# Patient Record
Sex: FEMALE | Race: WHITE | NOT HISPANIC OR LATINO | Employment: OTHER | URBAN - METROPOLITAN AREA
[De-identification: names, ages, dates, MRNs, and addresses within clinical notes are randomized per-mention and may not be internally consistent; named-entity substitution may affect disease eponyms.]

---

## 2017-04-07 ENCOUNTER — TRANSCRIBE ORDERS (OUTPATIENT)
Dept: ADMINISTRATIVE | Facility: HOSPITAL | Age: 82
End: 2017-04-07

## 2017-04-07 DIAGNOSIS — I10 ESSENTIAL HYPERTENSION: Primary | ICD-10-CM

## 2017-04-14 ENCOUNTER — HOSPITAL ENCOUNTER (OUTPATIENT)
Dept: NON INVASIVE DIAGNOSTICS | Facility: CLINIC | Age: 82
Discharge: HOME | End: 2017-04-14
Payer: MEDICARE

## 2017-04-14 DIAGNOSIS — I10 ESSENTIAL HYPERTENSION: ICD-10-CM

## 2017-04-14 PROCEDURE — 93017 CV STRESS TEST TRACING ONLY: CPT

## 2017-04-14 PROCEDURE — 78452 HT MUSCLE IMAGE SPECT MULT: CPT

## 2017-04-14 PROCEDURE — A9502 TC99M TETROFOSMIN: HCPCS

## 2017-04-14 RX ADMIN — REGADENOSON 0.4 MG: 0.08 INJECTION, SOLUTION INTRAVENOUS at 14:15

## 2017-04-25 LAB
CHEST PAIN STATEMENT: NORMAL
MAX DIASTOLIC BP: 79 MMHG
MAX HEART RATE: 79 BPM
MAX PREDICTED HEART RATE: 140 BPM
MAX. SYSTOLIC BP: 157 MMHG
PROTOCOL NAME: NORMAL
REASON FOR TERMINATION: NORMAL
TARGET HR FORMULA: NORMAL
TEST INDICATION: NORMAL
TIME IN EXERCISE PHASE: 182 S

## 2018-10-15 ENCOUNTER — TRANSCRIBE ORDERS (OUTPATIENT)
Dept: ADMINISTRATIVE | Facility: HOSPITAL | Age: 83
End: 2018-10-15

## 2018-10-15 ENCOUNTER — APPOINTMENT (OUTPATIENT)
Dept: LAB | Facility: CLINIC | Age: 83
End: 2018-10-15
Payer: MEDICARE

## 2018-10-15 DIAGNOSIS — I10 HYPERTENSION, ESSENTIAL: ICD-10-CM

## 2018-10-15 DIAGNOSIS — I10 HYPERTENSION, ESSENTIAL: Primary | ICD-10-CM

## 2018-10-15 DIAGNOSIS — R73.01 IMPAIRED FASTING GLUCOSE: ICD-10-CM

## 2018-10-15 LAB
ALBUMIN SERPL BCP-MCNC: 3.4 G/DL (ref 3.5–5)
ALP SERPL-CCNC: 101 U/L (ref 46–116)
ALT SERPL W P-5'-P-CCNC: 41 U/L (ref 12–78)
ANION GAP SERPL CALCULATED.3IONS-SCNC: 5 MMOL/L (ref 4–13)
AST SERPL W P-5'-P-CCNC: 31 U/L (ref 5–45)
BASOPHILS # BLD AUTO: 0.04 THOUSANDS/ΜL (ref 0–0.1)
BASOPHILS NFR BLD AUTO: 1 % (ref 0–1)
BILIRUB SERPL-MCNC: 0.33 MG/DL (ref 0.2–1)
BUN SERPL-MCNC: 11 MG/DL (ref 5–25)
CALCIUM SERPL-MCNC: 8.8 MG/DL (ref 8.3–10.1)
CHLORIDE SERPL-SCNC: 105 MMOL/L (ref 100–108)
CHOLEST SERPL-MCNC: 182 MG/DL (ref 50–200)
CO2 SERPL-SCNC: 30 MMOL/L (ref 21–32)
CREAT SERPL-MCNC: 0.62 MG/DL (ref 0.6–1.3)
EOSINOPHIL # BLD AUTO: 0.17 THOUSAND/ΜL (ref 0–0.61)
EOSINOPHIL NFR BLD AUTO: 2 % (ref 0–6)
ERYTHROCYTE [DISTWIDTH] IN BLOOD BY AUTOMATED COUNT: 14.4 % (ref 11.6–15.1)
EST. AVERAGE GLUCOSE BLD GHB EST-MCNC: 137 MG/DL
GFR SERPL CREATININE-BSD FRML MDRD: 84 ML/MIN/1.73SQ M
GLUCOSE P FAST SERPL-MCNC: 119 MG/DL (ref 65–99)
HBA1C MFR BLD: 6.4 % (ref 4.2–6.3)
HCT VFR BLD AUTO: 39.1 % (ref 34.8–46.1)
HDLC SERPL-MCNC: 58 MG/DL (ref 40–60)
HGB BLD-MCNC: 12 G/DL (ref 11.5–15.4)
IMM GRANULOCYTES # BLD AUTO: 0.02 THOUSAND/UL (ref 0–0.2)
IMM GRANULOCYTES NFR BLD AUTO: 0 % (ref 0–2)
LDLC SERPL CALC-MCNC: 100 MG/DL (ref 0–100)
LYMPHOCYTES # BLD AUTO: 1.45 THOUSANDS/ΜL (ref 0.6–4.47)
LYMPHOCYTES NFR BLD AUTO: 21 % (ref 14–44)
MCH RBC QN AUTO: 26.1 PG (ref 26.8–34.3)
MCHC RBC AUTO-ENTMCNC: 30.7 G/DL (ref 31.4–37.4)
MCV RBC AUTO: 85 FL (ref 82–98)
MONOCYTES # BLD AUTO: 0.65 THOUSAND/ΜL (ref 0.17–1.22)
MONOCYTES NFR BLD AUTO: 9 % (ref 4–12)
NEUTROPHILS # BLD AUTO: 4.63 THOUSANDS/ΜL (ref 1.85–7.62)
NEUTS SEG NFR BLD AUTO: 67 % (ref 43–75)
NRBC BLD AUTO-RTO: 0 /100 WBCS
PLATELET # BLD AUTO: 284 THOUSANDS/UL (ref 149–390)
PMV BLD AUTO: 10 FL (ref 8.9–12.7)
POTASSIUM SERPL-SCNC: 3.9 MMOL/L (ref 3.5–5.3)
PROT SERPL-MCNC: 6.9 G/DL (ref 6.4–8.2)
RBC # BLD AUTO: 4.59 MILLION/UL (ref 3.81–5.12)
SODIUM SERPL-SCNC: 140 MMOL/L (ref 136–145)
TRIGL SERPL-MCNC: 120 MG/DL
TSH SERPL DL<=0.05 MIU/L-ACNC: 1.6 UIU/ML (ref 0.36–3.74)
WBC # BLD AUTO: 6.96 THOUSAND/UL (ref 4.31–10.16)

## 2018-10-15 PROCEDURE — 83036 HEMOGLOBIN GLYCOSYLATED A1C: CPT

## 2018-10-15 PROCEDURE — 84443 ASSAY THYROID STIM HORMONE: CPT

## 2018-10-15 PROCEDURE — 80061 LIPID PANEL: CPT

## 2018-10-15 PROCEDURE — 85025 COMPLETE CBC W/AUTO DIFF WBC: CPT

## 2018-10-15 PROCEDURE — 36415 COLL VENOUS BLD VENIPUNCTURE: CPT

## 2018-10-15 PROCEDURE — 80053 COMPREHEN METABOLIC PANEL: CPT

## 2019-03-20 ENCOUNTER — TRANSCRIBE ORDERS (OUTPATIENT)
Dept: ADMINISTRATIVE | Facility: HOSPITAL | Age: 84
End: 2019-03-20

## 2019-03-20 ENCOUNTER — APPOINTMENT (OUTPATIENT)
Dept: LAB | Facility: CLINIC | Age: 84
End: 2019-03-20
Payer: MEDICARE

## 2019-03-20 DIAGNOSIS — R73.03 DIABETES MELLITUS, LATENT: Primary | ICD-10-CM

## 2019-03-20 DIAGNOSIS — M81.0 SENILE OSTEOPOROSIS: ICD-10-CM

## 2019-03-20 DIAGNOSIS — R73.03 DIABETES MELLITUS, LATENT: ICD-10-CM

## 2019-03-20 LAB
25(OH)D3 SERPL-MCNC: 32.8 NG/ML (ref 30–100)
EST. AVERAGE GLUCOSE BLD GHB EST-MCNC: 128 MG/DL
HBA1C MFR BLD: 6.1 % (ref 4.2–6.3)

## 2019-03-20 PROCEDURE — 36415 COLL VENOUS BLD VENIPUNCTURE: CPT

## 2019-03-20 PROCEDURE — 82306 VITAMIN D 25 HYDROXY: CPT

## 2019-03-20 PROCEDURE — 83036 HEMOGLOBIN GLYCOSYLATED A1C: CPT

## 2019-08-07 ENCOUNTER — APPOINTMENT (OUTPATIENT)
Dept: LAB | Facility: CLINIC | Age: 84
End: 2019-08-07
Payer: MEDICARE

## 2019-08-07 ENCOUNTER — TRANSCRIBE ORDERS (OUTPATIENT)
Dept: ADMINISTRATIVE | Facility: HOSPITAL | Age: 84
End: 2019-08-07

## 2019-08-07 DIAGNOSIS — R35.0 URINARY FREQUENCY: ICD-10-CM

## 2019-08-07 DIAGNOSIS — N39.0 URINARY TRACT INFECTION WITHOUT HEMATURIA, SITE UNSPECIFIED: ICD-10-CM

## 2019-08-07 DIAGNOSIS — N39.0 URINARY TRACT INFECTION WITHOUT HEMATURIA, SITE UNSPECIFIED: Primary | ICD-10-CM

## 2019-08-07 DIAGNOSIS — N32.81 DETRUSOR INSTABILITY OF BLADDER: ICD-10-CM

## 2019-08-07 LAB
BACTERIA UR QL AUTO: ABNORMAL /HPF
BILIRUB UR QL STRIP: NEGATIVE
CLARITY UR: ABNORMAL
COLOR UR: YELLOW
GLUCOSE UR STRIP-MCNC: NEGATIVE MG/DL
HGB UR QL STRIP.AUTO: NEGATIVE
KETONES UR STRIP-MCNC: NEGATIVE MG/DL
LEUKOCYTE ESTERASE UR QL STRIP: ABNORMAL
NITRITE UR QL STRIP: NEGATIVE
NON-SQ EPI CELLS URNS QL MICRO: ABNORMAL /HPF
PH UR STRIP.AUTO: 7 [PH]
PROT UR STRIP-MCNC: NEGATIVE MG/DL
RBC #/AREA URNS AUTO: ABNORMAL /HPF
SP GR UR STRIP.AUTO: 1.01 (ref 1–1.03)
UROBILINOGEN UR QL STRIP.AUTO: 0.2 E.U./DL
WBC #/AREA URNS AUTO: ABNORMAL /HPF

## 2019-08-07 PROCEDURE — 87086 URINE CULTURE/COLONY COUNT: CPT

## 2019-08-07 PROCEDURE — 81001 URINALYSIS AUTO W/SCOPE: CPT | Performed by: UROLOGY

## 2019-08-07 PROCEDURE — 87186 SC STD MICRODIL/AGAR DIL: CPT

## 2019-08-07 PROCEDURE — 87077 CULTURE AEROBIC IDENTIFY: CPT

## 2019-08-09 LAB
BACTERIA UR CULT: ABNORMAL
BACTERIA UR CULT: ABNORMAL

## 2019-08-28 ENCOUNTER — APPOINTMENT (EMERGENCY)
Dept: CT IMAGING | Facility: HOSPITAL | Age: 84
DRG: 394 | End: 2019-08-28
Payer: MEDICARE

## 2019-08-28 ENCOUNTER — HOSPITAL ENCOUNTER (INPATIENT)
Facility: HOSPITAL | Age: 84
LOS: 3 days | Discharge: HOME | DRG: 394 | End: 2019-09-01
Attending: EMERGENCY MEDICINE | Admitting: INTERNAL MEDICINE
Payer: MEDICARE

## 2019-08-28 DIAGNOSIS — K62.5 RECTAL BLEEDING: Primary | ICD-10-CM

## 2019-08-28 DIAGNOSIS — K52.9 COLITIS: ICD-10-CM

## 2019-08-28 DIAGNOSIS — E87.6 HYPOKALEMIA: ICD-10-CM

## 2019-08-28 LAB
ABO GROUP BLD: NORMAL
ALBUMIN SERPL BCP-MCNC: 3.2 G/DL (ref 3.5–5)
ALP SERPL-CCNC: 92 U/L (ref 46–116)
ALT SERPL W P-5'-P-CCNC: 21 U/L (ref 12–78)
ANION GAP SERPL CALCULATED.3IONS-SCNC: 8 MMOL/L (ref 4–13)
APTT PPP: 23 SECONDS (ref 23–37)
AST SERPL W P-5'-P-CCNC: 19 U/L (ref 5–45)
BACTERIA UR QL AUTO: ABNORMAL /HPF
BASOPHILS # BLD AUTO: 0.03 THOUSANDS/ΜL (ref 0–0.1)
BASOPHILS NFR BLD AUTO: 0 % (ref 0–1)
BILIRUB SERPL-MCNC: 0.5 MG/DL (ref 0.2–1)
BILIRUB UR QL STRIP: NEGATIVE
BLD GP AB SCN SERPL QL: NEGATIVE
BUN SERPL-MCNC: 9 MG/DL (ref 5–25)
CALCIUM SERPL-MCNC: 8.8 MG/DL (ref 8.3–10.1)
CHLORIDE SERPL-SCNC: 104 MMOL/L (ref 100–108)
CLARITY UR: CLEAR
CO2 SERPL-SCNC: 29 MMOL/L (ref 21–32)
COLOR UR: YELLOW
CREAT SERPL-MCNC: 0.58 MG/DL (ref 0.6–1.3)
EOSINOPHIL # BLD AUTO: 0.04 THOUSAND/ΜL (ref 0–0.61)
EOSINOPHIL NFR BLD AUTO: 0 % (ref 0–6)
ERYTHROCYTE [DISTWIDTH] IN BLOOD BY AUTOMATED COUNT: 14.1 % (ref 11.6–15.1)
GFR SERPL CREATININE-BSD FRML MDRD: 86 ML/MIN/1.73SQ M
GLUCOSE SERPL-MCNC: 132 MG/DL (ref 65–140)
GLUCOSE UR STRIP-MCNC: NEGATIVE MG/DL
HCT VFR BLD AUTO: 38.2 % (ref 34.8–46.1)
HGB BLD-MCNC: 11.7 G/DL (ref 11.5–15.4)
HGB UR QL STRIP.AUTO: ABNORMAL
IMM GRANULOCYTES # BLD AUTO: 0.04 THOUSAND/UL (ref 0–0.2)
IMM GRANULOCYTES NFR BLD AUTO: 0 % (ref 0–2)
INR PPP: 0.89 (ref 0.84–1.19)
KETONES UR STRIP-MCNC: NEGATIVE MG/DL
LACTATE SERPL-SCNC: 1 MMOL/L (ref 0.5–2)
LEUKOCYTE ESTERASE UR QL STRIP: ABNORMAL
LIPASE SERPL-CCNC: 99 U/L (ref 73–393)
LYMPHOCYTES # BLD AUTO: 2.16 THOUSANDS/ΜL (ref 0.6–4.47)
LYMPHOCYTES NFR BLD AUTO: 21 % (ref 14–44)
MCH RBC QN AUTO: 27.5 PG (ref 26.8–34.3)
MCHC RBC AUTO-ENTMCNC: 30.6 G/DL (ref 31.4–37.4)
MCV RBC AUTO: 90 FL (ref 82–98)
MONOCYTES # BLD AUTO: 1.04 THOUSAND/ΜL (ref 0.17–1.22)
MONOCYTES NFR BLD AUTO: 10 % (ref 4–12)
NEUTROPHILS # BLD AUTO: 6.84 THOUSANDS/ΜL (ref 1.85–7.62)
NEUTS SEG NFR BLD AUTO: 69 % (ref 43–75)
NITRITE UR QL STRIP: NEGATIVE
NON-SQ EPI CELLS URNS QL MICRO: ABNORMAL /HPF
NRBC BLD AUTO-RTO: 0 /100 WBCS
OTHER STN SPEC: ABNORMAL
PH UR STRIP.AUTO: 6 [PH]
PLATELET # BLD AUTO: 247 THOUSANDS/UL (ref 149–390)
PMV BLD AUTO: 10.5 FL (ref 8.9–12.7)
POTASSIUM SERPL-SCNC: 3.2 MMOL/L (ref 3.5–5.3)
PROT SERPL-MCNC: 6.6 G/DL (ref 6.4–8.2)
PROT UR STRIP-MCNC: NEGATIVE MG/DL
PROTHROMBIN TIME: 11.8 SECONDS (ref 11.6–14.5)
RBC # BLD AUTO: 4.25 MILLION/UL (ref 3.81–5.12)
RBC #/AREA URNS AUTO: ABNORMAL /HPF
RH BLD: POSITIVE
SODIUM SERPL-SCNC: 141 MMOL/L (ref 136–145)
SP GR UR STRIP.AUTO: <=1.005 (ref 1–1.03)
SPECIMEN EXPIRATION DATE: NORMAL
UROBILINOGEN UR QL STRIP.AUTO: 0.2 E.U./DL
WBC # BLD AUTO: 10.15 THOUSAND/UL (ref 4.31–10.16)
WBC #/AREA URNS AUTO: ABNORMAL /HPF

## 2019-08-28 PROCEDURE — 1123F ACP DISCUSS/DSCN MKR DOCD: CPT | Performed by: INTERNAL MEDICINE

## 2019-08-28 PROCEDURE — 85730 THROMBOPLASTIN TIME PARTIAL: CPT | Performed by: PHYSICIAN ASSISTANT

## 2019-08-28 PROCEDURE — 85610 PROTHROMBIN TIME: CPT | Performed by: PHYSICIAN ASSISTANT

## 2019-08-28 PROCEDURE — 36415 COLL VENOUS BLD VENIPUNCTURE: CPT | Performed by: PHYSICIAN ASSISTANT

## 2019-08-28 PROCEDURE — 99285 EMERGENCY DEPT VISIT HI MDM: CPT | Performed by: PHYSICIAN ASSISTANT

## 2019-08-28 PROCEDURE — 80053 COMPREHEN METABOLIC PANEL: CPT | Performed by: PHYSICIAN ASSISTANT

## 2019-08-28 PROCEDURE — 85025 COMPLETE CBC W/AUTO DIFF WBC: CPT | Performed by: PHYSICIAN ASSISTANT

## 2019-08-28 PROCEDURE — 83605 ASSAY OF LACTIC ACID: CPT | Performed by: PHYSICIAN ASSISTANT

## 2019-08-28 PROCEDURE — 83690 ASSAY OF LIPASE: CPT | Performed by: PHYSICIAN ASSISTANT

## 2019-08-28 PROCEDURE — 93005 ELECTROCARDIOGRAM TRACING: CPT

## 2019-08-28 PROCEDURE — 81001 URINALYSIS AUTO W/SCOPE: CPT | Performed by: PHYSICIAN ASSISTANT

## 2019-08-28 PROCEDURE — 86900 BLOOD TYPING SEROLOGIC ABO: CPT | Performed by: PHYSICIAN ASSISTANT

## 2019-08-28 PROCEDURE — 99285 EMERGENCY DEPT VISIT HI MDM: CPT

## 2019-08-28 PROCEDURE — 86850 RBC ANTIBODY SCREEN: CPT | Performed by: PHYSICIAN ASSISTANT

## 2019-08-28 PROCEDURE — 86901 BLOOD TYPING SEROLOGIC RH(D): CPT | Performed by: PHYSICIAN ASSISTANT

## 2019-08-28 PROCEDURE — 74177 CT ABD & PELVIS W/CONTRAST: CPT

## 2019-08-28 RX ORDER — BRIMONIDINE TARTRATE 0.1 %
DROPS OPHTHALMIC (EYE)
Refills: 5 | COMMUNITY
Start: 2019-07-12

## 2019-08-28 RX ORDER — NEBIVOLOL 5 MG/1
10 TABLET ORAL DAILY
Status: DISCONTINUED | OUTPATIENT
Start: 2019-08-29 | End: 2019-09-01 | Stop reason: HOSPADM

## 2019-08-28 RX ORDER — LISINOPRIL 20 MG/1
40 TABLET ORAL DAILY
Status: DISCONTINUED | OUTPATIENT
Start: 2019-08-29 | End: 2019-09-01 | Stop reason: HOSPADM

## 2019-08-28 RX ORDER — POTASSIUM CHLORIDE 20 MEQ/1
40 TABLET, EXTENDED RELEASE ORAL ONCE
Status: COMPLETED | OUTPATIENT
Start: 2019-08-28 | End: 2019-08-28

## 2019-08-28 RX ORDER — FUROSEMIDE 40 MG/1
40 TABLET ORAL DAILY
Status: DISCONTINUED | OUTPATIENT
Start: 2019-08-29 | End: 2019-09-01 | Stop reason: HOSPADM

## 2019-08-28 RX ORDER — LISINOPRIL 40 MG/1
40 TABLET ORAL DAILY
Refills: 5 | COMMUNITY
Start: 2019-07-24

## 2019-08-28 RX ORDER — AMLODIPINE BESYLATE 5 MG/1
5 TABLET ORAL DAILY
Status: DISCONTINUED | OUTPATIENT
Start: 2019-08-29 | End: 2019-09-01 | Stop reason: HOSPADM

## 2019-08-28 RX ORDER — AMITRIPTYLINE HYDROCHLORIDE 50 MG/1
50 TABLET, FILM COATED ORAL
Status: DISCONTINUED | OUTPATIENT
Start: 2019-08-28 | End: 2019-09-01 | Stop reason: HOSPADM

## 2019-08-28 RX ORDER — DORZOLAMIDE HCL 20 MG/ML
1 SOLUTION/ DROPS OPHTHALMIC 3 TIMES DAILY
Status: DISCONTINUED | OUTPATIENT
Start: 2019-08-29 | End: 2019-08-31

## 2019-08-28 RX ORDER — MIRABEGRON 25 MG/1
25 TABLET, FILM COATED, EXTENDED RELEASE ORAL DAILY
Refills: 5 | COMMUNITY
Start: 2019-07-24

## 2019-08-28 RX ORDER — POTASSIUM CHLORIDE 750 MG/1
10 TABLET, EXTENDED RELEASE ORAL 2 TIMES DAILY
Status: DISCONTINUED | OUTPATIENT
Start: 2019-08-29 | End: 2019-09-01 | Stop reason: HOSPADM

## 2019-08-28 RX ORDER — TRAVOPROST OPHTHALMIC SOLUTION 0.04 MG/ML
1 SOLUTION OPHTHALMIC
Status: DISCONTINUED | OUTPATIENT
Start: 2019-08-29 | End: 2019-09-01 | Stop reason: HOSPADM

## 2019-08-28 RX ORDER — OXYBUTYNIN CHLORIDE 5 MG/1
5 TABLET, EXTENDED RELEASE ORAL DAILY
Status: DISCONTINUED | OUTPATIENT
Start: 2019-08-29 | End: 2019-09-01 | Stop reason: HOSPADM

## 2019-08-28 RX ORDER — AMLODIPINE BESYLATE 5 MG/1
5 TABLET ORAL DAILY
COMMUNITY

## 2019-08-28 RX ADMIN — IOHEXOL 100 ML: 350 INJECTION, SOLUTION INTRAVENOUS at 18:41

## 2019-08-28 RX ADMIN — POTASSIUM CHLORIDE 40 MEQ: 20 TABLET, EXTENDED RELEASE ORAL at 21:30

## 2019-08-28 RX ADMIN — PIPERACILLIN SODIUM,TAZOBACTAM SODIUM 3.38 G: 3; .375 INJECTION, POWDER, FOR SOLUTION INTRAVENOUS at 21:34

## 2019-08-28 NOTE — ED PROVIDER NOTES
History  Chief Complaint   Patient presents with    Rectal Bleeding     To ED via EMS for evaluation of bright red rectal bleeding started last night  Denies any abd  pain, states that she had several episodes today  Patient is an 82 y/o F with h/o CAD, CHF, HTN that presents to the ED with BRBPR that started today  She states last night she had lower abdominal pain so she took 3 doses of pepto bismol  This morning when she woke up she felt like she had to have a BM, but when she went to the bathroom she just noticed bright red blood in the toilet  SHe states she had 6 episodes of bloody stools today  SHe has lower abdominal pain  No fevers, chills  No dizziness  No anticoagulants  No recent steroids  History provided by:  Patient  Rectal Bleeding - Minor   Quality:  Bright red  Amount: Moderate  Duration:  1 day  Timing:  Constant  Chronicity:  New  Context: defecation    Similar prior episodes: no    Relieved by:  Nothing  Worsened by:  Nothing  Associated symptoms: abdominal pain    Associated symptoms: no dizziness, no fever, no light-headedness, no loss of consciousness and no vomiting    Risk factors: no anticoagulant use, no NSAID use and no steroid use        Prior to Admission Medications   Prescriptions Last Dose Informant Patient Reported? Taking? ALPHAGAN P 0 1 %   Yes No   Sig: PUT 1 DROP IN BOTH EYES TWICE A DAY   MYRBETRIQ 25 MG TB24   Yes No   Sig: Take 25 mg by mouth daily   amitriptyline (ELAVIL) 50 mg tablet   Yes No   Sig: Take 50 mg by mouth daily at bedtime  dorzolamide (TRUSOPT) 2 % ophthalmic solution   Yes No   Sig: Administer 1 drop to both eyes 3 (three) times a day  furosemide (LASIX) 40 mg tablet   Yes No   Sig: Take 40 mg by mouth daily     lisinopril (ZESTRIL) 40 mg tablet   Yes No   Sig: Take 40 mg by mouth daily   meclizine (ANTIVERT) 12 5 MG tablet   No No   Sig: Take 1 tablet (12 5 mg total) by mouth 3 (three) times a day as needed for dizziness for up to 6 doses  nebivolol (BYSTOLIC) 10 mg tablet   Yes No   Sig: Take 10 mg by mouth 2 (two) times a day  potassium chloride (K-DUR) 10 mEq tablet   Yes No   Sig: Take 10 mEq by mouth 2 (two) times a day  travoprost (TRAVATAN Z) 0 004 % ophthalmic solution   Yes No   Si drop daily at bedtime  Facility-Administered Medications: None       Past Medical History:   Diagnosis Date    Cardiac disease     [facility]congest[facility] failure) ([facility] [address] )     Hypertension     Psychiatric disorder     depression       History reviewed  No pertinent surgical history  History reviewed  No pertinent family history  I have reviewed and agree with the history as documented  Social History     Tobacco Use    Smoking status: Never Smoker    Smokeless tobacco: Never Used   Substance Use Topics    Alcohol use: No    Drug use: No        Review of Systems   Constitutional: Negative for chills and fever  HENT: Negative  Respiratory: Negative for cough and shortness of breath  Cardiovascular: Negative for chest pain and leg swelling  Gastrointestinal: Positive for abdominal pain, blood in stool and hematochezia  Negative for diarrhea, nausea and vomiting  Musculoskeletal: Negative for back pain and neck pain  Skin: Negative for color change and pallor  Neurological: Negative for dizziness, loss of consciousness, facial asymmetry, weakness, light-headedness and numbness  Psychiatric/Behavioral: Negative for confusion  All other systems reviewed and are negative  Physical Exam  Physical Exam   Constitutional: She is oriented to person, place, and time  She appears well-developed and well-nourished  She is cooperative  She does not appear ill  No distress  HENT:   Head: Normocephalic and atraumatic  Nose: Nose normal    Mouth/Throat: Oropharynx is clear and moist and mucous membranes are normal    Eyes: Pupils are equal, round, and reactive to light   Conjunctivae are normal    Neck: Normal range of motion  Cardiovascular: Normal rate and normal heart sounds  An irregular rhythm present  No murmur heard  Pulmonary/Chest: Effort normal and breath sounds normal  She has no wheezes  She has no rhonchi  She has no rales  Abdominal: Soft  Normal appearance and bowel sounds are normal  There is tenderness in the right lower quadrant and left lower quadrant  There is no rigidity, no rebound and no guarding  Genitourinary: Rectal exam shows internal hemorrhoid and guaiac positive stool  Rectal exam shows no mass  Musculoskeletal: Normal range of motion  She exhibits no edema  Neurological: She is alert and oriented to person, place, and time  She has normal strength  No sensory deficit  Skin: Skin is warm and dry  No rash noted  She is not diaphoretic  No pallor  Nursing note and vitals reviewed        Vital Signs  ED Triage Vitals [08/28/19 1712]   Temperature Pulse Respirations Blood Pressure SpO2   (!) 97 2 °F (36 2 °C) 90 20 147/79 98 %      Temp Source Heart Rate Source Patient Position - Orthostatic VS BP Location FiO2 (%)   Tympanic Monitor Lying Right arm --      Pain Score       No Pain           Vitals:    08/28/19 2015 08/28/19 2030 08/28/19 2045 08/28/19 2100   BP: 163/79 167/80 (!) 173/79 164/73   Pulse: 80 82 87 82   Patient Position - Orthostatic VS: Lying Lying Lying Lying         Visual Acuity      ED Medications  Medications   potassium chloride (K-DUR,KLOR-CON) CR tablet 40 mEq (has no administration in time range)   piperacillin-tazobactam (ZOSYN) 3 375 g in sodium chloride 0 9 % 50 mL IVPB (has no administration in time range)   iohexol (OMNIPAQUE) 350 MG/ML injection (SINGLE-DOSE) 100 mL (100 mL Intravenous Given 8/28/19 1841)       Diagnostic Studies  Results Reviewed     Procedure Component Value Units Date/Time    Lactic acid, plasma [[id number]]  (Normal) Collected:  08/28/19 2007    Lab Status:  Final result Specimen:  Blood from Arm, Left Updated:  08/28/19 2057     LACTIC ACID 1 0 mmol/L     Narrative:       Result may be elevated if tourniquet was used during collection      Urine Microscopic [[id number]]  (Abnormal) Collected:  08/28/19 1754    Lab Status:  Final result Specimen:  Urine, Clean Catch Updated:  08/28/19 1826     RBC, UA 1-2 /hpf      WBC, UA 4-10 /hpf      Epithelial Cells Moderate /hpf      Bacteria, UA Occasional /hpf      OTHER OBSERVATIONS Transitional Epithelial Cells  Renal Tubule Epithelial Cells Present    Comprehensive metabolic panel [[id number]]  (Abnormal) Collected:  08/28/19 1753    Lab Status:  Final result Specimen:  Blood from Arm, Left Updated:  08/28/19 1822     Sodium 141 mmol/L      Potassium 3 2 mmol/L      Chloride 104 mmol/L      CO2 29 mmol/L      ANION GAP 8 mmol/L      BUN 9 mg/dL      Creatinine 0 58 mg/dL      Glucose 132 mg/dL      Calcium 8 8 mg/dL      AST 19 U/L      ALT 21 U/L      Alkaline Phosphatase 92 U/L      Total Protein 6 6 g/dL      Albumin 3 2 g/dL      Total Bilirubin 0 50 mg/dL      eGFR 86 ml/min/1 73sq m     Narrative:       [place] guidelines for Chronic Kidney Disease (CKD):     Stage 1 with normal or high GFR (GFR > 90 mL/min/1 73 square meters)    Stage 2 Mild CKD (GFR = 60-89 mL/min/1 73 square meters)    Stage 3A Moderate CKD (GFR = 45-59 mL/min/1 73 square meters)    Stage 3B Moderate CKD (GFR = 30-44 mL/min/1 73 square meters)    Stage 4 Severe CKD (GFR = 15-29 mL/min/1 73 square meters)    Stage 5 End Stage CKD (GFR <15 mL/min/1 73 square meters)  Note: GFR calculation is accurate only with a steady state creatinine    Lipase [[id number]]  (Normal) Collected:  08/28/19 1753    Lab Status:  Final result Specimen:  Blood from Arm, Left Updated:  08/28/19 1822     Lipase 99 u/L     Protime-INR [[id number]]  (Normal) Collected:  08/28/19 1753    Lab Status:  Final result Specimen:  Blood from Arm, Left Updated:  08/28/19 1817     Protime 11 8 seconds      INR 0 89    APTT [[id number]] (Normal) Collected:  08/28/19 1753    Lab Status:  Final result Specimen:  Blood from Arm, Left Updated:  08/28/19 1817     PTT 23 seconds     UA w Reflex to Microscopic w Reflex to Culture [[id number]]  (Abnormal) Collected:  08/28/19 1754    Lab Status:  Final result Specimen:  Urine, Clean Catch Updated:  08/28/19 1810     Color, UA Yellow     Clarity, UA Clear     Specific Gravity, UA <=1 005     pH, UA 6 0     Leukocytes, UA Small     Nitrite, UA Negative     Protein, UA Negative mg/dl      Glucose, UA Negative mg/dl      Ketones, UA Negative mg/dl      Urobilinogen, UA 0 2 E U /dl      Bilirubin, UA Negative     Blood, UA Trace-Intact    CBC and differential [[id number]]  (Abnormal) Collected:  08/28/19 1753    Lab Status:  Final result Specimen:  Blood from Arm, Left Updated:  08/28/19 1807     WBC 10 15 Thousand/uL      RBC 4 25 Million/uL      Hemoglobin 11 7 g/dL      Hematocrit 38 2 %      MCV 90 fL      MCH 27 5 pg      MCHC 30 6 g/dL      RDW 14 1 %      MPV 10 5 fL      Platelets 258 Thousands/uL      nRBC 0 /100 WBCs      Neutrophils Relative 69 %      Immat GRANS % 0 %      Lymphocytes Relative 21 %      Monocytes Relative 10 %      Eosinophils Relative 0 %      Basophils Relative 0 %      Neutrophils Absolute 6 84 Thousands/µL      Immature Grans Absolute 0 04 Thousand/uL      Lymphocytes Absolute 2 16 Thousands/µL      Monocytes Absolute 1 04 Thousand/µL      Eosinophils Absolute 0 04 Thousand/µL      Basophils Absolute 0 03 Thousands/µL                  CT abdomen pelvis with contrast   Final Result by Precious Luu MD (08/28 1919)      Inflammatory, infectious or ischemic colitis involving the splenic flexure and proximal descending colon  No evidence of bowel perforation, obstruction or abscess              Workstation performed: [id number]                    Procedures  ECG 12 Lead Documentation Only  Date/Time: 8/28/2019 5:17 PM  Performed by: Michelle Ivy PA-C  Authorized by: Nicole Moody PA-C     Indications / Diagnosis:  Irregular heart rate  Patient location:  ED  Previous ECG:     Previous ECG:  Compared to current    Similarity:  No change  Rate:     ECG rate:  91  Rhythm:     Rhythm: sinus rhythm    ST segments:     ST segments:  Normal  T waves:     T waves: normal             ED Course                               MDM  Number of Diagnoses or Management Options  Colitis: new and requires workup  Hypokalemia: new and requires workup  Rectal bleeding: new and requires workup  Diagnosis management comments: Patient with rectal bleeding, lower abdominal pain, will order labs and CT to r/o anemia, electrolyte abnormality, diverticulitis, colitis, colon CA  Lactic ordered to r/o ischemic colitis  Amount and/or Complexity of Data Reviewed  Clinical lab tests: ordered and reviewed  Tests in the radiology section of CPT®: ordered and reviewed  Discuss the patient with other providers: yes Mckayla Colmenares)    Patient Progress  Patient progress: stable      Disposition  Final diagnoses:   Rectal bleeding   Colitis   Hypokalemia     Time reflects when diagnosis was documented in both MDM as applicable and the Disposition within this note     Time User Action Codes Description Comment    8/28/2019  9:06 PM Miladys Rachel Add [K62 5] Rectal bleeding     8/28/2019  9:06 PM Miladys Rachel Add [K52 9] Colitis     8/28/2019  9:08 PM Miladys Rachel Add [E87 6] Hypokalemia       ED Disposition     ED Disposition Condition Date/Time Comment    Admit Stable Wed Aug 28, 2019  9:08 PM Case was discussed with Lucila Kohler and the patient's admission status was agreed to be Admission Status: observation status to the service of Dr Angle Herrera   Follow-up Information    None         Patient's Medications   Discharge Prescriptions    No medications on file     No discharge procedures on file      ED Provider  Electronically Signed by           Nicole Moody PA-C  08/28/19 2750

## 2019-08-29 PROBLEM — K52.9 COLITIS: Status: ACTIVE | Noted: 2019-08-29

## 2019-08-29 PROBLEM — K62.5 RECTAL BLEEDING: Status: ACTIVE | Noted: 2019-08-29

## 2019-08-29 PROBLEM — E87.6 HYPOKALEMIA: Status: ACTIVE | Noted: 2019-08-29

## 2019-08-29 LAB
ANION GAP SERPL CALCULATED.3IONS-SCNC: 6 MMOL/L (ref 4–13)
ATRIAL RATE: 91 BPM
BASOPHILS # BLD AUTO: 0.02 THOUSANDS/ΜL (ref 0–0.1)
BASOPHILS NFR BLD AUTO: 0 % (ref 0–1)
BUN SERPL-MCNC: 8 MG/DL (ref 5–25)
CALCIUM SERPL-MCNC: 8.3 MG/DL (ref 8.3–10.1)
CHLORIDE SERPL-SCNC: 108 MMOL/L (ref 100–108)
CO2 SERPL-SCNC: 28 MMOL/L (ref 21–32)
CREAT SERPL-MCNC: 0.56 MG/DL (ref 0.6–1.3)
EOSINOPHIL # BLD AUTO: 0.04 THOUSAND/ΜL (ref 0–0.61)
EOSINOPHIL NFR BLD AUTO: 0 % (ref 0–6)
ERYTHROCYTE [DISTWIDTH] IN BLOOD BY AUTOMATED COUNT: 14.2 % (ref 11.6–15.1)
GFR SERPL CREATININE-BSD FRML MDRD: 87 ML/MIN/1.73SQ M
GLUCOSE P FAST SERPL-MCNC: 98 MG/DL (ref 65–99)
GLUCOSE SERPL-MCNC: 98 MG/DL (ref 65–140)
HCT VFR BLD AUTO: 36 % (ref 34.8–46.1)
HGB BLD-MCNC: 11 G/DL (ref 11.5–15.4)
IMM GRANULOCYTES # BLD AUTO: 0.01 THOUSAND/UL (ref 0–0.2)
IMM GRANULOCYTES NFR BLD AUTO: 0 % (ref 0–2)
LYMPHOCYTES # BLD AUTO: 2.16 THOUSANDS/ΜL (ref 0.6–4.47)
LYMPHOCYTES NFR BLD AUTO: 24 % (ref 14–44)
MAGNESIUM SERPL-MCNC: 1.8 MG/DL (ref 1.6–2.6)
MCH RBC QN AUTO: 27.4 PG (ref 26.8–34.3)
MCHC RBC AUTO-ENTMCNC: 30.6 G/DL (ref 31.4–37.4)
MCV RBC AUTO: 90 FL (ref 82–98)
MONOCYTES # BLD AUTO: 1 THOUSAND/ΜL (ref 0.17–1.22)
MONOCYTES NFR BLD AUTO: 11 % (ref 4–12)
NEUTROPHILS # BLD AUTO: 5.84 THOUSANDS/ΜL (ref 1.85–7.62)
NEUTS SEG NFR BLD AUTO: 65 % (ref 43–75)
NRBC BLD AUTO-RTO: 0 /100 WBCS
P AXIS: 50 DEGREES
PLATELET # BLD AUTO: 227 THOUSANDS/UL (ref 149–390)
PMV BLD AUTO: 10.3 FL (ref 8.9–12.7)
POTASSIUM SERPL-SCNC: 4 MMOL/L (ref 3.5–5.3)
PR INTERVAL: 156 MS
QRS AXIS: 16 DEGREES
QRSD INTERVAL: 98 MS
QT INTERVAL: 364 MS
QTC INTERVAL: 447 MS
RBC # BLD AUTO: 4.01 MILLION/UL (ref 3.81–5.12)
SODIUM SERPL-SCNC: 142 MMOL/L (ref 136–145)
T WAVE AXIS: 89 DEGREES
VENTRICULAR RATE: 91 BPM
WBC # BLD AUTO: 9.07 THOUSAND/UL (ref 4.31–10.16)

## 2019-08-29 PROCEDURE — 99223 1ST HOSP IP/OBS HIGH 75: CPT | Performed by: INTERNAL MEDICINE

## 2019-08-29 PROCEDURE — 83735 ASSAY OF MAGNESIUM: CPT | Performed by: PHYSICIAN ASSISTANT

## 2019-08-29 PROCEDURE — 80048 BASIC METABOLIC PNL TOTAL CA: CPT | Performed by: PHYSICIAN ASSISTANT

## 2019-08-29 PROCEDURE — 93010 ELECTROCARDIOGRAM REPORT: CPT | Performed by: INTERNAL MEDICINE

## 2019-08-29 PROCEDURE — 85025 COMPLETE CBC W/AUTO DIFF WBC: CPT | Performed by: PHYSICIAN ASSISTANT

## 2019-08-29 RX ADMIN — TRAVOPROST 1 DROP: 0.04 SOLUTION/ DROPS OPHTHALMIC at 22:14

## 2019-08-29 RX ADMIN — FUROSEMIDE 40 MG: 40 TABLET ORAL at 09:21

## 2019-08-29 RX ADMIN — LISINOPRIL 40 MG: 20 TABLET ORAL at 09:21

## 2019-08-29 RX ADMIN — Medication 3.38 G: at 17:51

## 2019-08-29 RX ADMIN — OXYBUTYNIN CHLORIDE 5 MG: 5 TABLET, EXTENDED RELEASE ORAL at 09:21

## 2019-08-29 RX ADMIN — Medication 3.38 G: at 11:44

## 2019-08-29 RX ADMIN — DORZOLAMIDE HYDROCHLORIDE 1 DROP: 20 SOLUTION/ DROPS OPHTHALMIC at 17:52

## 2019-08-29 RX ADMIN — DORZOLAMIDE HYDROCHLORIDE 1 DROP: 20 SOLUTION/ DROPS OPHTHALMIC at 20:45

## 2019-08-29 RX ADMIN — Medication 3.38 G: at 05:10

## 2019-08-29 RX ADMIN — POTASSIUM CHLORIDE 10 MEQ: 750 TABLET, EXTENDED RELEASE ORAL at 17:51

## 2019-08-29 RX ADMIN — NEBIVOLOL HYDROCHLORIDE 10 MG: 5 TABLET ORAL at 09:21

## 2019-08-29 RX ADMIN — AMLODIPINE BESYLATE 5 MG: 5 TABLET ORAL at 09:21

## 2019-08-29 RX ADMIN — DORZOLAMIDE HYDROCHLORIDE 1 DROP: 20 SOLUTION/ DROPS OPHTHALMIC at 09:21

## 2019-08-29 RX ADMIN — POTASSIUM CHLORIDE 10 MEQ: 750 TABLET, EXTENDED RELEASE ORAL at 09:21

## 2019-08-29 RX ADMIN — AMITRIPTYLINE HYDROCHLORIDE 50 MG: 50 TABLET, FILM COATED ORAL at 22:14

## 2019-08-29 RX ADMIN — AMITRIPTYLINE HYDROCHLORIDE 50 MG: 50 TABLET, FILM COATED ORAL at 01:40

## 2019-08-29 NOTE — CONSULTS
Consultation - Lucy Servin 81 y o  female MRN: [id number]  Unit/Bed#: [address] [place] [address] [id number] Encounter: [id number]    Consults  ASSESSMENT and PLAN    Rectal bleeding  Assessment & Plan  Likely related to acute colitis but diverticular bleed is also a possibility with diverticulosis seen on CT scan  Still with some BRBPR but abdominal pain has largely resolved  If she is still having significant BRBPR, would continue to observe and trend CBC  Patient did move her bowels this morning  Noted a small amount of brown stool with small amount bright red blood, clots  Would continue to observe for resolution of rectal bleeding prior to discharge  Discussed with nursing     -follow CBC trend  -continue to observe rectal bleeding  -eventual colonoscopy 6-8 weeks    * Colitis  Assessment & Plan  Inflammatory, infectious or ischemic colitis  CT scan showed "   wall thickening throughout the colon at the splenic flexure and proximal descending colon  Diverticulosis in the sigmoid colon without diverticulitis "  No evidence of bowel perforation, obstruction or abscess noted on CT  Abdominal pain mostly resolved  WBC has normalized  Afebrile  Tolerating house diet     -continue IV Zosyn until discharge  -follow CBC trend  -will need a colonoscopy but this can be accomplished outpatient in 6-8 weeks      Chief Complaint   Patient presents with    Rectal Bleeding     To ED via EMS for evaluation of bright red rectal bleeding started last night  Denies any abd  pain, states that she had several episodes today  Physician Requesting Consult: DO JOSH Farias Mode is a 81 y o   female who presents with  cramping lower abdominal pain occurring 8/27 the evening  She took Pepto-Bismol x3 for this  8:28 a m  she had 6 episodes of BRBPR in the toilet with continued abdominal pain so she presented to the ER    CT scan showed wall thickening at the splenic flexure and proximal descending colon as well as diverticulosis with no evidence of diverticulitis  The patient describes fecal urgency and thought she would have diarrhea but reports that there was red blood in the toilet and with wiping  Also some clots  She is normally on the constipated side with firm bowel movements requiring straining about 3 days per week  She does not use stool softeners or laxatives  Recalls a colonoscopy many years ago but does not remember where or who performed this  No anticoagulation, family history of bowel issues or colonic abnormalities  Denies fever/chills, nausea/vomiting, diarrhea, UGI or alarm symptoms  Appetite has been normal   No unintended weight loss  Historical Information   Past Medical History:   Diagnosis Date    Cardiac disease     [facility]) ([facility] [address] [facility]     Hypertension     Psychiatric disorder     depression     History reviewed  No pertinent surgical history  Social History   Social History     Substance and Sexual Activity   Alcohol Use Never     Social History     Substance and Sexual Activity   Drug Use Never     Social History     Tobacco Use   Smoking Status Never Smoker   Smokeless Tobacco Never Used     History reviewed  No pertinent family history      Meds/Allergies   Current Facility-Administered Medications   Medication Dose Route Frequency    amitriptyline (ELAVIL) tablet 50 mg  50 mg Oral HS    amLODIPine (NORVASC) tablet 5 mg  5 mg Oral Daily    dorzolamide (TRUSOPT) ophthalmic solution 1 drop  1 drop Both Eyes TID    furosemide (LASIX) tablet 40 mg  40 mg Oral Daily    lisinopril (ZESTRIL) tablet 40 mg  40 mg Oral Daily    nebivolol (BYSTOLIC) tablet 10 mg  10 mg Oral Daily    oxybutynin (DITROPAN-XL) 24 hr tablet 5 mg  5 mg Oral Daily    piperacillin-tazobactam (ZOSYN) 3 375 g in sodium chloride 0 9 % 50 mL IVPB  3 375 g Intravenous Q6H    potassium chloride (K-DUR,KLOR-CON) CR tablet 10 mEq  10 mEq Oral BID    travoprost (TRAVATAN-Z) 0 004 % ophthalmic solution 1 drop  1 drop Both Eyes HS     Medications Prior to Admission   Medication    amLODIPine (NORVASC) 5 mg tablet    ALPHAGAN P 0 1 %    amitriptyline (ELAVIL) 50 mg tablet    dorzolamide (TRUSOPT) 2 % ophthalmic solution    furosemide (LASIX) 40 mg tablet    lisinopril (ZESTRIL) 40 mg tablet    meclizine (ANTIVERT) 12 5 MG tablet    MYRBETRIQ 25 MG TB24    nebivolol (BYSTOLIC) 10 mg tablet    potassium chloride (K-DUR) 10 mEq tablet    travoprost (TRAVATAN Z) 0 004 % ophthalmic solution       No Known Allergies    10 Point Review of Systems - Gastrointestinal ROS:  Positive for abdominal pain, rectal bleeding  All other systems are negative  PHYSICALEXAM  Blood pressure 116/72, pulse 98, temperature 98 2 °F (36 8 °C), temperature source Oral, resp  rate 20, height 4' 10" (1 473 m), weight 66 kg (145 lb 6 4 oz), SpO2 94 %, not currently breastfeeding  Body mass index is 30 39 kg/m²  General Appearance: NAD, cooperative, alert  Eyes: Anicteric, PERRLA, EOMI  ENT:  Normocephalic, atraumatic, normal mucosa  Neck:  Supple, symmetrical, trachea midline  Resp:  Clear but decreased at bases to auscultation bilaterally; no rales, rhonchi or wheezing; respirations unlabored   CV:  S1 S2, Regular rate and rhythm; no murmur, rub, or gallop  GI:  Soft, non-tender, non-distended; normal bowel sounds; no masses, no organomegaly   Rectal: Deferred  :  Deferred   Musculoskeletal: No cyanosis, clubbing or edema  Normal ROM    Skin:  No jaundice, rashes, or lesions   Heme/Lymph: No palpable cervical lymphadenopathy  Psych: Normal affect, good eye contact  Neuro: No gross deficits, AAOx3    Lab Results   Component Value Date    CALCIUM 8 3 08/29/2019    K 4 0 08/29/2019    CO2 28 08/29/2019     08/29/2019    BUN 8 08/29/2019    CREATININE 0 56 (L) 08/29/2019     Lab Results   Component Value Date    WBC 9 07 08/29/2019    HGB 11 0 (L) 08/29/2019    HCT 36 0 08/29/2019    MCV 90 08/29/2019     08/29/2019     Lab Results   Component Value Date    ALT 21 08/28/2019    AST 19 08/28/2019    ALKPHOS 92 08/28/2019     No results found for: AMYLASE  Lab Results   Component Value Date    LIPASE 99 08/28/2019     No results found for: IRON, TIBC, FERRITIN  Lab Results   Component Value Date    INR 0 89 08/28/2019       Imaging Studies: I have personally reviewed pertinent reports  EKG, Pathology, and Other Studies: I have personally reviewed pertinent reports

## 2019-08-29 NOTE — UTILIZATION REVIEW
Initial Clinical Review    Admission: Date/Time/Statement: Observation 8/28/19 @2109  Orders Placed This Encounter   Procedures    Place in Observation (expected length of stay for this patient is less than two midnights)     Standing Status:   Standing     Number of Occurrences:   1     Order Specific Question:   Admitting Physician     Answer:   Bee Genao [[id number]]     Order Specific Question:   Level of Care     Answer:   Med Surg [[id number]]     ED Arrival Information     Expected Arrival Acuity Means of Arrival Escorted By Service Admission Type    - 8/28/2019 17:08 Urgent Ambulance [address] [facility] Urgent    Arrival Complaint    RECTAL BLEEDING        Chief Complaint   Patient presents with    Rectal Bleeding     To ED via EMS for evaluation of bright red rectal bleeding started last night  Denies any abd  pain, states that she had several episodes today  Assessment/Plan: 81year old female to the ED from home via EMS with complaints of blood in her stool with lower abdominal pain  Admitted under observation for colitis  IV antibiotics initiated in the ED to treat probable infectious colitis  She complains of abdominal pain, diarrhea, 6 episodes of bloody stool during the day prior to her arrival  Trend CBC  GI consult  Hgb every 8 hours  GI consult 8/29: Abdominal pain resolved  WBC normalized  Tolerating house diet  Recommend supportive management, follow H&H, liquid bland diet advancing as tolerated, if diarrhea check stool culture and C diff (had multiple rounds of antibiotics for UTIs),     Anticipated Length of Stay:  Patient will be admitted on an Observation basis with an anticipated length of stay of  <             2 midnights     Justification for Hospital Stay:  Rectal bleeding     ED Triage Vitals [08/28/19 1712]   Temperature Pulse Respirations Blood Pressure SpO2   (!) 97 2 °F (36 2 °C) 90 20 147/79 98 %      Temp Source Heart Rate Source Patient Position - Orthostatic VS BP Location FiO2 (%)   Tympanic Monitor Lying Right arm --      Pain Score       No Pain        Wt Readings from Last 1 Encounters:   08/29/19 66 kg (145 lb 6 4 oz)     Additional Vital Signs:  Date/Time  Temp  Pulse  Resp  BP  SpO2  O2 Device  Patient Position - Orthostatic VS   08/29/19 0822  98 2 °F (36 8 °C)  98  20  116/72  94 %  None (Room air)  Sitting   08/28/19 2256  98 7 °F (37 1 °C)  86  18  143/68  95 %  None (Room air)  Lying   08/28/19 2200    84  20  160/77  97 %  None (Room air)  Lying   08/28/19 2145    79  20  153/78  96 %  None (Room air)  Lying   08/28/19 2130        166/81      Lying   08/28/19 2030    82  20  167/80  95 %  None (Room air)  Lying   08/28/19 1945    80  18  166/77  94 %  None (Room air)  Lying   08/28/19 1830    74  18  163/78  97 %  None (Room air)  Lying   08/28/19 1815    76  22  166/77             Pertinent Labs/Diagnostic Test Results:   CT A/P:  Inflammatory, infectious or ischemic colitis involving the splenic flexure and proximal descending colon   No evidence of bowel perforation, obstruction or abscess    Results from last 7 days   Lab Units 08/29/19 08/28/19  1753   WBC Thousand/uL 9 07 10 15   HEMOGLOBIN g/dL 11 0* 11 7   HEMATOCRIT % 36 0 38 2   PLATELETS Thousands/uL 227 247   NEUTROS ABS Thousands/µL 5 84 6 84         Results from last 7 days   Lab Units 08/29/19  0600 08/28/19  1753   SODIUM mmol/L 142 141   POTASSIUM mmol/L 4 0 3 2*   CHLORIDE mmol/L 108 104   CO2 mmol/L 28 29   ANION GAP mmol/L 6 8   BUN mg/dL 8 9   CREATININE mg/dL 0 56* 0 58*   EGFR ml/min/1 73sq m 87 86   CALCIUM mg/dL 8 3 8 8   MAGNESIUM mg/dL 1 8  --      Results from last 7 days   Lab Units 08/28/19  1753   AST U/L 19   ALT U/L 21   ALK PHOS U/L 92   TOTAL PROTEIN g/dL 6 6   ALBUMIN g/dL 3 2*   TOTAL BILIRUBIN mg/dL 0 50         Results from last 7 days   Lab Units 08/29/19  0600 08/28/19  1753   GLUCOSE RANDOM mg/dL 98 132       Results from last 7 days   Lab Units 08/28/19  3268 PROTIME seconds 11 8   INR  0 89   PTT seconds 23             Results from last 7 days   Lab Units 08/28/19 2007   LACTIC ACID mmol/L 1 0       Results from last 7 days   Lab Units 08/28/19  1753   LIPASE u/L 99       Results from last 7 days   Lab Units 08/28/19  1754   CLARITY UA  Clear   COLOR UA  Yellow   SPEC GRAV UA  <=1 005   PH UA  6 0   GLUCOSE UA mg/dl Negative   KETONES UA mg/dl Negative   BLOOD UA  Trace-Intact*   PROTEIN UA mg/dl Negative   NITRITE UA  Negative   BILIRUBIN UA  Negative   UROBILINOGEN UA E U /dl 0 2   LEUKOCYTES UA  Small*   WBC UA /hpf 4-10*   RBC UA /hpf 1-2*   BACTERIA UA /hpf Occasional   EPITHELIAL CELLS WET PREP /hpf Moderate*       ED Treatment:   Medication Administration from 08/28/2019 1707 to 08/28/2019 2247       Date/Time Order Dose Route Action     08/28/2019 2130 potassium chloride (K-DUR,KLOR-CON) CR tablet 40 mEq 40 mEq Oral Given     08/28/2019 2134 piperacillin-tazobactam (ZOSYN) 3 375 g in sodium chloride 0 9 % 50 mL IVPB 3 375 g Intravenous New Bag        Past Medical History:   Diagnosis Date    Cardiac disease     CHF [facility]congestive [facility] failure) ([facility] [address] [facility]     Hypertension     Psychiatric disorder     depression       Admitting Diagnosis: Hypokalemia [E87 6]  Colitis [K52 9]  Rectal bleeding [K62 5]  Age/Sex: 81 y o  female  Admission Orders:  I/O  CBC  Clear liquids  Current Facility-Administered Medications:  amitriptyline 50 mg Oral HS   amLODIPine 5 mg Oral Daily   dorzolamide 1 drop Both Eyes TID   furosemide 40 mg Oral Daily   lisinopril 40 mg Oral Daily   nebivolol 10 mg Oral Daily   oxybutynin 5 mg Oral Daily   piperacillin-tazobactam 3 375 g Intravenous Q6H   potassium chloride 10 mEq Oral BID   travoprost 1 drop Both Eyes HS       IP CONSULT TO GASTROENTEROLOGY  IP CONSULT TO CASE MANAGEMENT    Network Utilization Review Department  Phone: [phone]; Fax [phone]  [email or web address]  ATTENTION: Please call with any questions or concerns to [phone]  and carefully listen to the prompts so that you are directed to the right person  Send all requests for admission clinical reviews, approved or denied determinations and any other requests to fax [phone]   All voicemails are confidential

## 2019-08-29 NOTE — PLAN OF CARE
Problem: Potential for Falls  Goal: Patient will remain free of falls  Description  INTERVENTIONS:  - Assess patient frequently for physical needs  -  Identify cognitive and physical deficits and behaviors that affect risk of falls    -  [place] fall precautions as indicated by assessment   - Educate patient/family on patient safety including physical limitations  - Instruct patient to call for assistance with activity based on assessment  - Modify environment to reduce risk of injury  - Consider OT/PT consult to assist with strengthening/mobility  Outcome: Progressing

## 2019-08-29 NOTE — ASSESSMENT & PLAN NOTE
Likely related to acute colitis but diverticular bleed is also a possibility with diverticulosis seen on CT scan  No further BRBPR but nursing staff report bowel movement this morning was greenish black  Patient says it was liquid, which may be related to the antibiotic  Will need to exclude C diff  Denies chest pain, heartburn, reflux but may need to consider UGI source for her hemoglobin which continues to drift down  Abdominal pain has resolved  Would continue to observe for resolution of melena, hematochezia prior to discharge    Discussed with nursing     -follow CBC trend  -check stool for C diff  -will check iron studies, B12 and folate  -continue to observe rectal bleeding, melena  -will add PPI for GI prophylaxis  -may need EGD to exclude UGI source for new anemia  -colonoscopy 6-8 weeks to follow up the rectal bleeding and colitis

## 2019-08-29 NOTE — H&P
H&P- Leonidas Oppenheim 1935, 81 y o  female MRN: [id number]    Unit/Bed#: [address] [place] [address] Encounter: [id number]    Primary Care Provider: Bev Davis MD   Date and time admitted to hospital: 8/28/2019  5:13 PM        * Colitis  Assessment & Plan  Inflammatory, infectious or ischemic colitis involving the splenic flexure and proximal descending colon per CT scan  Started on Zosyn 3 375 Q 6 IV  Infectious etiology probably related to the rectal bleeding  No hemorrhoids identified  Hemoglobin is stable  Hemoglobin every 8 hours  GI consult      Rectal bleeding  Assessment & Plan  See above    Hypokalemia  Assessment & Plan  Replete potassium recheck labs in the a m  Depression  Assessment & Plan  Continue amitriptyline    Hypertension  Assessment & Plan  Continue Bystolic, Norvasc Lasix and lisinopril        VTE Prophylaxis:rectal bleeding holding / sequential compression device   Code Status:  DNR 2  POLST: POLST form is not discussed and not completed at this time  Anticipated Length of Stay:  Patient will be admitted on an Observation basis with an anticipated length of stay of  <  2 midnights  Justification for Hospital Stay:  Rectal bleeding    Total Time for Visit, including Counseling / Coordination of Care: 30 minutes  Greater than 50% of this total time spent on direct patient counseling and coordination of care  Chief Complaint:   Rectal bleeding and colitis    History of Present Illness:    Leonidas Oppenheim is a 81 y o  female with a history of CAD CHF hypertension that presents to the ED with bright red blood from the rectum which started today, patient stated she had lower abdominal pain last night and also loose stools so she took 3 doses of Pepto-Bismol this morning she felt again like she had upper p m   When she went to the bathroom she notices bright red blood in the toilet and had 6 other episodes of bloody stool today she stated it soft stool with diarrhea mostly bright red blood but no bleeding without bowel moved  Which the lower abdominal pain continued to now  She denies any fever chills dizziness recent steroid use all use of anticoagulation excessive ibuprofen or aspirin use  As of note Pepto-Bismol usually contains aspirin   Review of Systems:    Review of Systems   Constitutional: Negative for appetite change, diaphoresis, fatigue and fever  HENT: Negative for congestion, facial swelling, rhinorrhea, sneezing and tinnitus  Eyes: Negative for photophobia, pain and discharge  Respiratory: Negative for apnea, cough and shortness of breath  Cardiovascular: Negative for chest pain and leg swelling  Gastrointestinal: Positive for abdominal pain, blood in stool and diarrhea  Negative for abdominal distention, nausea and vomiting  Endocrine: Negative for cold intolerance, polydipsia and polyphagia  Genitourinary: Negative for enuresis, frequency and hematuria  Musculoskeletal: Negative for arthralgias, gait problem and myalgias  Skin: Negative for color change and rash  Allergic/Immunologic: Negative for environmental allergies and food allergies  Neurological: Negative for syncope, speech difficulty, light-headedness and headaches  Hematological: Negative for adenopathy  Does not bruise/bleed easily  Psychiatric/Behavioral: Negative for behavioral problems, decreased concentration and hallucinations  The patient is not hyperactive  Past Medical and Surgical History:     Past Medical History:   Diagnosis Date    Cardiac disease     [facility]congest[facility] failure) ([facility] [address] )     Hypertension     Psychiatric disorder     depression       History reviewed  No pertinent surgical history  Meds/Allergies:    Prior to Admission medications    Medication Sig Start Date End Date Taking?  Authorizing Provider   amLODIPine (NORVASC) 5 mg tablet Take 5 mg by mouth daily   Yes Historical Provider, MD WALKERGAN P 0 1 % PUT 1 DROP IN BOTH EYES TWICE A DAY 7/12/19 Historical Provider, MD   amitriptyline (ELAVIL) 50 mg tablet Take 50 mg by mouth daily at bedtime  Historical Provider, MD   dorzolamide (TRUSOPT) 2 % ophthalmic solution Administer 1 drop to both eyes 3 (three) times a day  Historical Provider, MD   furosemide (LASIX) 40 mg tablet Take 40 mg by mouth daily  Historical Provider, MD   lisinopril (ZESTRIL) 40 mg tablet Take 40 mg by mouth daily 7/24/19   Historical Provider, MD   meclizine (ANTIVERT) 12 5 MG tablet Take 1 tablet (12 5 mg total) by mouth 3 (three) times a day as needed for dizziness for up to 6 doses  6/25/16   Keisha Carlson, MD   MYRBETRIQ 25 MG TB24 Take 25 mg by mouth daily 7/24/19   Historical Provider, MD   nebivolol (BYSTOLIC) 10 mg tablet Take 10 mg by mouth 2 (two) times a day  Historical Provider, MD   potassium chloride (K-DUR) 10 mEq tablet Take 10 mEq by mouth 2 (two) times a day  Historical Provider, MD   travoprost (TRAVATAN Z) 0 004 % ophthalmic solution 1 drop daily at bedtime  Historical Provider, MD     I have reviewed home medications using allscripts  Allergies: No Known Allergies    Social History:     Marital Status:    Occupation: retired  Patient Pre-hospital Living Situation: home  Patient Pre-hospital Level of Mobility: walks  Patient Pre-hospital Diet Restrictions: noen   Substance Use History:   Social History     Substance and Sexual Activity   Alcohol Use Never     Social History     Tobacco Use   Smoking Status Never Smoker   Smokeless Tobacco Never Used     Social History     Substance and Sexual Activity   Drug Use Never       Family History:    History reviewed  No pertinent family history      Physical Exam:     Vitals:   Blood Pressure: 143/68 (08/28/19 2256)  Pulse: 86 (08/28/19 2256)  Temperature: 98 7 °F (37 1 °C) (08/28/19 2256)  Temp Source: Oral (08/28/19 2256)  Respirations: 18 (08/28/19 2256)  Height: 4' 10" (147 3 cm) (08/28/19 2256)  Weight - Scale: 64 5 kg (142 lb 3 2 oz) (08/28/19 2256)  SpO2: 95 % (08/28/19 2256)    Physical Exam   Constitutional: She is oriented to person, place, and time  She appears well-developed and well-nourished  No distress  HENT:   Head: Normocephalic and atraumatic  Eyes: Pupils are equal, round, and reactive to light  Conjunctivae and EOM are normal  No scleral icterus  Neck: Normal range of motion  Neck supple  No JVD present  No tracheal deviation present  Cardiovascular: Normal rate, regular rhythm and intact distal pulses  Exam reveals no friction rub  Murmur heard  Pulmonary/Chest: Effort normal and breath sounds normal  No stridor  No respiratory distress  She has no wheezes  She has no rales  Abdominal: Soft  Bowel sounds are normal  She exhibits no distension  There is tenderness  Musculoskeletal: She exhibits no edema or tenderness  Neurological: She is alert and oriented to person, place, and time  She has normal reflexes  Skin: Skin is warm and dry  She is not diaphoretic  Psychiatric: She has a normal mood and affect  Her behavior is normal    Nursing note and vitals reviewed  Additional Data:     Lab Results: I have personally reviewed pertinent reports  Results from last 7 days   Lab Units 08/28/19  1753   WBC Thousand/uL 10 15   HEMOGLOBIN g/dL 11 7   HEMATOCRIT % 38 2   PLATELETS Thousands/uL 247   NEUTROS PCT % 69   LYMPHS PCT % 21   MONOS PCT % 10   EOS PCT % 0     Results from last 7 days   Lab Units 08/28/19  1753   POTASSIUM mmol/L 3 2*   CHLORIDE mmol/L 104   CO2 mmol/L 29   BUN mg/dL 9   CREATININE mg/dL 0 58*   CALCIUM mg/dL 8 8   ALK PHOS U/L 92   ALT U/L 21   AST U/L 19     Results from last 7 days   Lab Units 08/28/19  1753   INR  0 89       Imaging: I have personally reviewed pertinent reports  Ct Abdomen Pelvis With Contrast    Result Date: 8/28/2019  Narrative: CT ABDOMEN AND PELVIS WITH IV CONTRAST INDICATION:   Lower abdominal pain, rectal bleeding    COMPARISON:  12/27/2014   TECHNIQUE: CT examination of the abdomen and pelvis was performed  Axial, sagittal, and coronal 2D reformatted images were created from the source data and submitted for interpretation  Radiation dose length product (DLP) for this visit:  375 mGy-cm   This examination, like all CT scans performed in the [facility], was performed utilizing techniques to minimize radiation dose exposure, including the use of iterative reconstruction and automated exposure control  IV Contrast:  100 mL of iohexol (OMNIPAQUE) Enteric Contrast:  Enteric contrast was not administered  FINDINGS: ABDOMEN LOWER CHEST:  Clear lung bases  LIVER/BILIARY TREE:  Subcentimeter hypoattenuating lesion in the dome of the liver, too small to characterize, though likely to represent a cyst  GALLBLADDER:  Few punctate gallstones in the gallbladder fundus without evidence of acute cholecystitis  SPLEEN:  Unremarkable  PANCREAS:  Unremarkable  ADRENAL GLANDS:  Unremarkable  KIDNEYS/URETERS:  Left renal cortical 9 mm cyst   No pyelonephritis or obstructive uropathy  STOMACH AND BOWEL:  Wall thickening throughout the colon at the splenic flexure and proximal descending colon  Diverticulosis in the sigmoid colon without diverticulitis  No evidence of bowel obstruction  Right ventral omental fat-containing hernia along the rectus sheath measuring 4 cm  More superiorly located right parasagittal mental fat-containing hernia, also measuring 4 cm  APPENDIX:  Normal appendix  ABDOMINOPELVIC CAVITY:  No ascites or free intraperitoneal air  No lymphadenopathy  VESSELS:  Ectatic abdominal aorta  PELVIS REPRODUCTIVE ORGANS:  No adnexal mass or cyst  URINARY BLADDER:  Unremarkable  ABDOMINAL WALL/INGUINAL REGIONS:  Unremarkable  OSSEOUS STRUCTURES: Upper lumbar dextroscoliosis and compensatory lower lumbar levoscoliosis  L4 anterolisthesis likely secondary to severe disc and facet degenerative change  No acute fracture or destructive osseous lesion  Impression: Inflammatory, infectious or ischemic colitis involving the splenic flexure and proximal descending colon  No evidence of bowel perforation, obstruction or abscess  Workstation performed: [id number]       EKG, Pathology, and Other Studies Reviewed on Admission:   · EKG: NSR    Epic / Care Everywhere Records Reviewed: Yes     ** Please Note: This note has been constructed using a voice recognition system   **

## 2019-08-29 NOTE — ASSESSMENT & PLAN NOTE
Inflammatory, infectious or ischemic colitis involving the splenic flexure and proximal descending colon per CT scan  Started on Zosyn 3 375 Q 6 IV  Infectious etiology probably related to the rectal bleeding  No hemorrhoids identified  Hemoglobin is stable  Hemoglobin every 8 hours  GI consult

## 2019-08-29 NOTE — PHYSICIAN ADVISOR
Current patient class: Observation  The patient is currently on Hospital Day: 2 at [place] [address]      This patient was originally admitted to the hospital under observation status  After admission the patient was reevaluated and determined to require further hospitalization  The patient is now documented to require at least a 2nd midnight in the hospital  As such the patient is now expected to satisfy the 2 midnight benchmark and is therefore eligible for inpatient admission  After review of the relevant documentation, labs, vital signs and test results, the patient is appropriate for INPATIENT ADMISSION  Admission to the hospital as an inpatient is a complex decision making process which requires the practitioner to consider the patients presenting complaint, history and physical examination and all relevant testing  With this in mind, in this case, the patient was deemed appropriate for INPATIENT ADMISSION  After review of the documentation and testing available at the time of the admission I concur with this clinical determination of medical necessity  Rationale is as follows: The patient is an 58-year-old female who presented to the hospital on August 28, 2019  She had bright red blood per rectum which is likely secondary to ischemic colitis  The patient's hospitalization will now span a second midnight  She has been evaluated by Gastroenterology  Infectious colitis will be excluded  She will receive ongoing supportive care  For now, she remains on intravenous Zosyn every 6 hours  Fortunately hemoglobin is stable but will continue to be monitored  Electrolytes are being replaced  At this point the patient requires ongoing hospital management and has become appropriate for change to inpatient class      The patients vitals on arrival were ED Triage Vitals [08/28/19 1712]   Temperature Pulse Respirations Blood Pressure SpO2   (!) 97 2 °F (36 2 °C) 90 20 147/79 98 % Temp Source Heart Rate Source Patient Position - Orthostatic VS BP Location FiO2 (%)   Tympanic Monitor Lying Right arm --      Pain Score       No Pain           Past Medical History:   Diagnosis Date    Cardiac disease     CHF (congestive heart failure) (HCC)     Hypertension     Psychiatric disorder     depression     History reviewed  No pertinent surgical history      The patient was admitted to the hospital at N/A on N/A for the following diagnosis:  Hypokalemia [E87 6]  Colitis [K52 9]  Rectal bleeding [K62 5]    Consults have been placed to:   IP CONSULT TO GASTROENTEROLOGY  IP CONSULT TO CASE MANAGEMENT    Vitals:    08/28/19 2256 08/29/19 0600 08/29/19 0822 08/29/19 1500   BP: 143/68  116/72 145/57   BP Location: Right arm  Right arm Right arm   Pulse: 86  98 66   Resp: 18  20 18   Temp: 98 7 °F (37 1 °C)  98 2 °F (36 8 °C) 97 7 °F (36 5 °C)   TempSrc: Oral  Oral Oral   SpO2: 95%  94% 95%   Weight: 64 5 kg (142 lb 3 2 oz) 66 kg (145 lb 6 4 oz)     Height: 4' 10" (1 473 m)          Most recent labs:    Recent Labs     08/28/19  1753 08/29/19 08/29/19  0600   WBC 10 15 9 07  --    HGB 11 7 11 0*  --    HCT 38 2 36 0  --     227  --    K 3 2*  --  4 0   CALCIUM 8 8  --  8 3   BUN 9  --  8   CREATININE 0 58*  --  0 56*   LIPASE 99  --   --    INR 0 89  --   --    AST 19  --   --    ALT 21  --   --    ALKPHOS 92  --   --        Scheduled Meds:  Current Facility-Administered Medications:  amitriptyline 50 mg Oral HS Sidra Ocasio PA-C    amLODIPine 5 mg Oral Daily Sidra Ocasoi PA-C    dorzolamide 1 drop Both Eyes TID Sidra Ocasio PA-C    furosemide 40 mg Oral Daily Sidra Ocasio PA-C    lisinopril 40 mg Oral Daily Sidra Ocasio PA-C    nebivolol 10 mg Oral Daily Sidra Ocasio PA-C    oxybutynin 5 mg Oral Daily Sidra Ocasio PA-C    piperacillin-tazobactam 3 375 g Intravenous Q6H Sidra Ocasio PA-C Last Rate: 3 375 g (08/29/19 1144)   potassium chloride 10 mEq Oral BID Sidra Ocasio PA-C    travoprost 1 drop Both Eyes HS Sidra Ocasio PA-C      Continuous Infusions:   PRN Meds:      Surgical procedures (if appropriate):

## 2019-08-29 NOTE — SOCIAL WORK
LOS: 0  Patient is not a 30 day re admission or a Medicare Bundled patient    Met with patient to discuss the discharge planning process including identifying help at home and her preference for discharge planning  Patient reports residing alone in a ranch home with 6 + 5 KATHRYN since her 's death 6 years ago  Patient is independent of ADL's and uses a SPC when outdoors  She prepares meals and her daughter transport  Patient identifies her daughter, Angel Shah as her care at home though she lives 15 minutes away  Patient sees her PCP on a regulars basis and follows up with specialist as needed  She uses [facility] in [place] and reports she can afford her current meds  She has a POA and Advanced Directive  Was asked to have family bring them in  She has no history of VNA, SNF, MH or D&A  She plans to return home and anticipates no discharge needs

## 2019-08-30 PROBLEM — D64.9 ANEMIA, UNSPECIFIED: Status: ACTIVE | Noted: 2019-08-30

## 2019-08-30 LAB
BASOPHILS # BLD AUTO: 0.04 THOUSANDS/ΜL (ref 0–0.1)
BASOPHILS NFR BLD AUTO: 1 % (ref 0–1)
EOSINOPHIL # BLD AUTO: 0.2 THOUSAND/ΜL (ref 0–0.61)
EOSINOPHIL NFR BLD AUTO: 2 % (ref 0–6)
ERYTHROCYTE [DISTWIDTH] IN BLOOD BY AUTOMATED COUNT: 14.4 % (ref 11.6–15.1)
HCT VFR BLD AUTO: 33.8 % (ref 34.8–46.1)
HGB BLD-MCNC: 10.2 G/DL (ref 11.5–15.4)
IMM GRANULOCYTES # BLD AUTO: 0.03 THOUSAND/UL (ref 0–0.2)
IMM GRANULOCYTES NFR BLD AUTO: 0 % (ref 0–2)
LYMPHOCYTES # BLD AUTO: 1.83 THOUSANDS/ΜL (ref 0.6–4.47)
LYMPHOCYTES NFR BLD AUTO: 21 % (ref 14–44)
MCH RBC QN AUTO: 27.4 PG (ref 26.8–34.3)
MCHC RBC AUTO-ENTMCNC: 30.2 G/DL (ref 31.4–37.4)
MCV RBC AUTO: 91 FL (ref 82–98)
MONOCYTES # BLD AUTO: 0.96 THOUSAND/ΜL (ref 0.17–1.22)
MONOCYTES NFR BLD AUTO: 11 % (ref 4–12)
NEUTROPHILS # BLD AUTO: 5.72 THOUSANDS/ΜL (ref 1.85–7.62)
NEUTS SEG NFR BLD AUTO: 65 % (ref 43–75)
NRBC BLD AUTO-RTO: 0 /100 WBCS
PLATELET # BLD AUTO: 212 THOUSANDS/UL (ref 149–390)
PMV BLD AUTO: 10.3 FL (ref 8.9–12.7)
RBC # BLD AUTO: 3.72 MILLION/UL (ref 3.81–5.12)
WBC # BLD AUTO: 8.78 THOUSAND/UL (ref 4.31–10.16)

## 2019-08-30 PROCEDURE — 99232 SBSQ HOSP IP/OBS MODERATE 35: CPT | Performed by: INTERNAL MEDICINE

## 2019-08-30 PROCEDURE — 85025 COMPLETE CBC W/AUTO DIFF WBC: CPT | Performed by: NURSE PRACTITIONER

## 2019-08-30 RX ORDER — PANTOPRAZOLE SODIUM 40 MG/1
40 TABLET, DELAYED RELEASE ORAL
Status: DISCONTINUED | OUTPATIENT
Start: 2019-08-30 | End: 2019-09-01 | Stop reason: HOSPADM

## 2019-08-30 RX ADMIN — Medication 3.38 G: at 22:52

## 2019-08-30 RX ADMIN — Medication 3.38 G: at 05:36

## 2019-08-30 RX ADMIN — NEBIVOLOL HYDROCHLORIDE 10 MG: 5 TABLET ORAL at 08:56

## 2019-08-30 RX ADMIN — POTASSIUM CHLORIDE 10 MEQ: 750 TABLET, EXTENDED RELEASE ORAL at 17:31

## 2019-08-30 RX ADMIN — LISINOPRIL 40 MG: 20 TABLET ORAL at 08:56

## 2019-08-30 RX ADMIN — DORZOLAMIDE HYDROCHLORIDE 1 DROP: 20 SOLUTION/ DROPS OPHTHALMIC at 08:56

## 2019-08-30 RX ADMIN — Medication 3.38 G: at 11:34

## 2019-08-30 RX ADMIN — DORZOLAMIDE HYDROCHLORIDE 1 DROP: 20 SOLUTION/ DROPS OPHTHALMIC at 17:33

## 2019-08-30 RX ADMIN — DORZOLAMIDE HYDROCHLORIDE 1 DROP: 20 SOLUTION/ DROPS OPHTHALMIC at 21:07

## 2019-08-30 RX ADMIN — AMITRIPTYLINE HYDROCHLORIDE 50 MG: 50 TABLET, FILM COATED ORAL at 21:07

## 2019-08-30 RX ADMIN — PANTOPRAZOLE SODIUM 40 MG: 40 TABLET, DELAYED RELEASE ORAL at 11:34

## 2019-08-30 RX ADMIN — FUROSEMIDE 40 MG: 40 TABLET ORAL at 08:56

## 2019-08-30 RX ADMIN — Medication 3.38 G: at 00:00

## 2019-08-30 RX ADMIN — Medication 3.38 G: at 17:38

## 2019-08-30 RX ADMIN — AMLODIPINE BESYLATE 5 MG: 5 TABLET ORAL at 08:56

## 2019-08-30 RX ADMIN — OXYBUTYNIN CHLORIDE 5 MG: 5 TABLET, EXTENDED RELEASE ORAL at 08:56

## 2019-08-30 RX ADMIN — TRAVOPROST 1 DROP: 0.04 SOLUTION/ DROPS OPHTHALMIC at 21:08

## 2019-08-30 RX ADMIN — POTASSIUM CHLORIDE 10 MEQ: 750 TABLET, EXTENDED RELEASE ORAL at 08:56

## 2019-08-30 NOTE — ASSESSMENT & PLAN NOTE
GI bleed most likely secondary to ischemic colitis, now clinically improving   Stool cultures no growth, infectious etiology ruled out  Continue Zosyn for 1 more day, discontinue tomorrow on discharge   trend CBC daily  Appreciate GI consult

## 2019-08-30 NOTE — UTILIZATION REVIEW
OBS order  8/28 2109 converted to IP 8/29 @ 1704 for continued treatment of rectal bleeding     Admitting Physician Rishabh ARITA    Level of Care Med Surg    Estimated length of stay More than 2 Midnights    Certification I certify that inpatient services are medically necessary for this patient for a duration of greater than two midnights  See H&P and MD Progress Notes for additional information about the patient's course of treatment

## 2019-08-30 NOTE — PLAN OF CARE
Problem: Potential for Falls  Goal: Patient will remain free of falls  Description  INTERVENTIONS:  - Assess patient frequently for physical needs  -  Identify cognitive and physical deficits and behaviors that affect risk of falls    -  [place] fall precautions as indicated by assessment   - Educate patient/family on patient safety including physical limitations  - Instruct patient to call for assistance with activity based on assessment  - Modify environment to reduce risk of injury  - Consider OT/PT consult to assist with strengthening/mobility  Outcome: Progressing     Problem: PAIN - ADULT  Goal: Verbalizes/displays adequate comfort level or baseline comfort level  Description  Interventions:  - Encourage patient to monitor pain and request assistance  - Assess pain using appropriate pain scale  - Administer analgesics based on type and severity of pain and evaluate response  - Implement non-pharmacological measures as appropriate and evaluate response  - Consider cultural and social influences on pain and pain management  - Notify physician/advanced practitioner if interventions unsuccessful or patient reports new pain  Outcome: Progressing     Problem: SAFETY ADULT  Goal: Maintain or return to baseline ADL function  Description  INTERVENTIONS:  -  Assess patient's ability to carry out ADLs; assess patient's baseline for ADL function and identify physical deficits which impact ability to perform ADLs (bathing, care of mouth/teeth, toileting, grooming, dressing, etc )  - Assess/evaluate cause of self-care deficits   - Assess range of motion  - Assess patient's mobility; develop plan if impaired  - Assess patient's need for assistive devices and provide as appropriate  - Encourage maximum independence but intervene and supervise when necessary  - Involve family in performance of ADLs  - Assess for home care needs following discharge   - Consider OT consult to assist with ADL evaluation and planning for discharge  - Provide patient education as appropriate  Outcome: Progressing  Goal: Maintain or return mobility status to optimal level  Description  INTERVENTIONS:  - Assess patient's baseline mobility status (ambulation, transfers, stairs, etc )    - Identify cognitive and physical deficits and behaviors that affect mobility  - Identify mobility aids required to assist with transfers and/or ambulation (gait belt, sit-to-stand, lift, walker, cane, etc )  - [place] fall precautions as indicated by assessment  - Record patient progress and toleration of activity level on Mobility SBAR; progress patient to next Phase/Stage  - Instruct patient to call for assistance with activity based on assessment  - Consider rehabilitation consult to assist with strengthening/weightbearing, etc   Outcome: Progressing     Problem: DISCHARGE PLANNING  Goal: Discharge to home or other facility with appropriate resources  Description  INTERVENTIONS:  - Identify barriers to discharge w/patient and caregiver  - Arrange for needed discharge resources and transportation as appropriate  - Identify discharge learning needs (meds, wound care, etc )  - Arrange for interpretive services to assist at discharge as needed  - Refer to Case Management Department for coordinating discharge planning if the patient needs post-hospital services based on physician/advanced practitioner order or complex needs related to functional status, cognitive ability, or social support system  Outcome: Progressing

## 2019-08-30 NOTE — PROGRESS NOTES
Progress Note - Tae Furrow 1935, 81 y o  female MRN: [id number]    Unit/Bed#: [address] [place] [address] Encounter: [id number]    Primary Care Provider: Mel Sesay MD   Date and time admitted to hospital: 2019  5:13 PM        Rectal bleeding  Assessment & Plan  See above    Hypokalemia  Assessment & Plan  Replete potassium recheck labs in the a m  Depression  Assessment & Plan  Continue amitriptyline    Hypertension  Assessment & Plan  Continue Bystolic, Norvasc Lasix and lisinopril    * Colitis  Assessment & Plan  GI bleed most likely secondary to ischemic colitis, now clinically improving   Stool cultures no growth, infectious etiology ruled out  Continue Zosyn for 1 more day, discontinue tomorrow on discharge   trend CBC daily  Appreciate GI consult      VTE Pharmacologic Prophylaxis:   Pharmacologic: Pharmacologic VTE Prophylaxis contraindicated due to acute GI bleed  Mechanical VTE Prophylaxis in Place: Yes    Patient Centered Rounds: I have performed bedside rounds with nursing staff today  Discussions with Specialists or Other Care Team Provider:  Gastroenterology    Education and Discussions with Family / Patient: Called Ms Sandra Garcia, Daughter, not picking up  Voicemail left    Time Spent for Care: 30 minutes  More than 50% of total time spent on counseling and coordination of care as described above  Current Length of Stay: 1 day(s)    Current Patient Status: Inpatient   Certification Statement: The patient will continue to require additional inpatient hospital stay due to acute colitis with GI bleed    Discharge Plan: Tomorrow    Code Status: Level 1 - Full Code      Subjective:   No overnight events  Stools no longer bloody, now black    Residual left-sided lower abdominal pain but overall improving clinically and tolerating a diet    Objective:     Vitals:   Temp (24hrs), Av 1 °F (36 7 °C), Min:97 7 °F (36 5 °C), Max:98 5 °F (36 9 °C)    Temp:  [97 7 °F (36 5 °C)-98 5 °F (36 9 °C)] 98 2 °F (36 8 °C)  HR:  [62-68] 62  Resp:  [18] 18  BP: (122-158)/(57-66) 122/58  SpO2:  [95 %-96 %] 96 %  Body mass index is 30 31 kg/m²  Input and Output Summary (last 24 hours): Intake/Output Summary (Last 24 hours) at 8/30/2019 1401  Last data filed at 8/30/2019 1301  Gross per 24 hour   Intake 480 ml   Output 650 ml   Net -170 ml       Physical Exam:     Physical Exam   Cardiovascular: Normal rate, regular rhythm, normal heart sounds and intact distal pulses  Exam reveals no gallop and no friction rub  No murmur heard  Pulmonary/Chest: Effort normal and breath sounds normal  No stridor  No respiratory distress  She has no wheezes  She has no rales  She exhibits no tenderness  Abdominal: Soft  Bowel sounds are normal  She exhibits no distension and no mass  There is no tenderness  There is no rebound and no guarding  Musculoskeletal: Normal range of motion  Skin: Skin is warm  Vitals reviewed  Additional Data:     Labs:    Results from last 7 days   Lab Units 08/30/19  0532   WBC Thousand/uL 8 78   HEMOGLOBIN g/dL 10 2*   HEMATOCRIT % 33 8*   PLATELETS Thousands/uL 212   NEUTROS PCT % 65   LYMPHS PCT % 21   MONOS PCT % 11   EOS PCT % 2     Results from last 7 days   Lab Units 08/29/19  0600 08/28/19  1753   SODIUM mmol/L 142 141   POTASSIUM mmol/L 4 0 3 2*   CHLORIDE mmol/L 108 104   CO2 mmol/L 28 29   BUN mg/dL 8 9   CREATININE mg/dL 0 56* 0 58*   ANION GAP mmol/L 6 8   CALCIUM mg/dL 8 3 8 8   ALBUMIN g/dL  --  3 2*   TOTAL BILIRUBIN mg/dL  --  0 50   ALK PHOS U/L  --  92   ALT U/L  --  21   AST U/L  --  19   GLUCOSE RANDOM mg/dL 98 132     Results from last 7 days   Lab Units 08/28/19  1753   INR  0 89             Results from last 7 days   Lab Units 08/28/19 2007   LACTIC ACID mmol/L 1 0           * I Have Reviewed All Lab Data Listed Above  * Additional Pertinent Lab Tests Reviewed:  Melanie 66 Admission Reviewed    Imaging:    Imaging Reports Reviewed Today Include:   Imaging Personally Reviewed by Myself Includes:      Recent Cultures (last 7 days):           Last 24 Hours Medication List:     Current Facility-Administered Medications:  amitriptyline 50 mg Oral HS Heidimarie I MELCHOR Ocasio    amLODIPine 5 mg Oral Daily Heidimarie I MELCHOR Ocasio    dorzolamide 1 drop Both Eyes TID Heidimarie I MELCHOR Ocasio    furosemide 40 mg Oral Daily Heidimarie I MELCHOR Ocasio    lisinopril 40 mg Oral Daily Heidimarie I MELCHOR Ocasio    nebivolol 10 mg Oral Daily Heidimarie I MELCHOR Ocasio    oxybutynin 5 mg Oral Daily Heidimarie I MELCHOR Ocasio    pantoprazole 40 mg Oral Early Morning JIGAR Blount    piperacillin-tazobactam 3 375 g Intravenous Q6H Sidra I MELCHOR Ocasio Last Rate: 3 375 g (08/30/19 1134)   potassium chloride 10 mEq Oral BID Heidimarie I MELCHOR Ocasio    travoprost 1 drop Both Eyes HS Heidimarie I MELCHOR Ocasio         Today, Patient Was Seen By: Tal Prakash MD    ** Please Note: Dictation voice to text software may have been used in the creation of this document   **

## 2019-08-30 NOTE — PROGRESS NOTES
Progress note - Gastroenterology   Gladys Day 81 y o  female MRN: [id number]  Unit/Bed#: [address] [place] [address] [id number] Encounter: [id number]    ASSESSMENT and PLAN    Rectal bleeding  Assessment & Plan  Likely related to acute colitis but diverticular bleed is also a possibility with diverticulosis seen on CT scan  No further BRBPR but nursing staff report bowel movement this morning was greenish black  Patient says it was liquid, which may be related to the antibiotic  Will need to exclude C diff  Denies chest pain, heartburn, reflux but may need to consider UGI source for her hemoglobin which continues to drift down  Abdominal pain has resolved  Would continue to observe for resolution of melena, hematochezia prior to discharge  Discussed with nursing     -follow CBC trend  -check stool for C diff  -will check iron studies, B12 and folate  -continue to observe rectal bleeding, melena  -will add PPI for GI prophylaxis  -may need EGD to exclude UGI source for new anemia  -colonoscopy 6-8 weeks to follow up the rectal bleeding and colitis    * Colitis  Assessment & Plan  Inflammatory, infectious or ischemic colitis  CT scan showed "   wall thickening throughout the colon at the splenic flexure and proximal descending colon  Diverticulosis in the sigmoid colon without diverticulitis "  No evidence of bowel perforation, obstruction or abscess noted on CT  Abdominal pain  resolved  WBC's remain normalized  Afebrile  Tolerating house diet but reports she is eating lite things      -continue IV Zosyn until discharge  -follow CBC trend  -will need a colonoscopy but this can be accomplished outpatient in 6-8 weeks        Chief Complaint   Patient presents with    Rectal Bleeding     To ED via EMS for evaluation of bright red rectal bleeding started last night  Denies any abd  pain, states that she had several episodes today          SUBJECTIVE/HPI   81 y o   female who presents with  cramping lower abdominal pain occurring 8/27 the evening  She took Pepto-Bismol x3 for this  8:28 a m  she had 6 episodes of BRBPR in the toilet with continued abdominal pain so she presented to the ER  CT scan showed wall thickening at the splenic flexure and proximal descending colon as well as diverticulosis with no evidence of diverticulitis  The patient describes fecal urgency and thought she would have diarrhea but reports that there was red blood in the toilet and with wiping  Also some clots  She is normally on the constipated side with firm bowel movements requiring straining about 3 days per week  She does not use stool softeners or laxatives  Recalls a colonoscopy many years ago but does not remember where or who performed this  No anticoagulation, family history of bowel issues or colonic abnormalities  Denies fever/chills, nausea/vomiting, diarrhea, UGI or alarm symptoms  Appetite has been normal   No unintended weight loss  Blood Pressure 122/58 (BP Location: Right arm)   Pulse 62   Temperature 98 2 °F (36 8 °C) (Oral)   Respiration 18   Height 4' 10" (1 473 m)   Weight 65 8 kg (145 lb)   Last Menstrual Period  (LMP Unknown)   Oxygen Saturation 96%   Breastfeeding?  No   Body Mass Index 30 31 kg/m²     PHYSICALEXAM  General appearance: alert, appears stated age and cooperative  Head: Normocephalic, without obvious abnormality, atraumatic  Lungs: clear to auscultation bilaterally  Heart: regular rate and rhythm, S1, S2 normal, no murmur, click, rub or gallop  Abdomen: soft, non-tender; bowel sounds normal; no masses,  no organomegaly  Extremities: extremities normal, atraumatic, no cyanosis or edema  Neurologic: Grossly normal    Lab Results   Component Value Date    CALCIUM 8 3 08/29/2019    K 4 0 08/29/2019    CO2 28 08/29/2019     08/29/2019    BUN 8 08/29/2019    CREATININE 0 56 (L) 08/29/2019     Lab Results   Component Value Date    WBC 8 78 08/30/2019    HGB 10 2 (L) 08/30/2019    HCT 33 8 (L) 08/30/2019    MCV 91 08/30/2019     08/30/2019     Lab Results   Component Value Date    ALT 21 08/28/2019    AST 19 08/28/2019    ALKPHOS 92 08/28/2019     No results found for: AMYLASE  Lab Results   Component Value Date    LIPASE 99 08/28/2019     No results found for: IRON, TIBC, FERRITIN  Lab Results   Component Value Date    INR 0 89 08/28/2019       Counseling / Coordination of Care  Total floor / unit time spent today 20 minutes

## 2019-08-31 LAB
BASOPHILS # BLD AUTO: 0.04 THOUSANDS/ΜL (ref 0–0.1)
BASOPHILS NFR BLD AUTO: 1 % (ref 0–1)
EOSINOPHIL # BLD AUTO: 0.3 THOUSAND/ΜL (ref 0–0.61)
EOSINOPHIL NFR BLD AUTO: 5 % (ref 0–6)
ERYTHROCYTE [DISTWIDTH] IN BLOOD BY AUTOMATED COUNT: 14.3 % (ref 11.6–15.1)
FERRITIN SERPL-MCNC: 12 NG/ML (ref 8–388)
FERRITIN SERPL-MCNC: 12 NG/ML (ref 8–388)
FOLATE SERPL-MCNC: 14.9 NG/ML (ref 3.1–17.5)
HCT VFR BLD AUTO: 35.1 % (ref 34.8–46.1)
HGB BLD-MCNC: 10.6 G/DL (ref 11.5–15.4)
IMM GRANULOCYTES # BLD AUTO: 0.02 THOUSAND/UL (ref 0–0.2)
IMM GRANULOCYTES NFR BLD AUTO: 0 % (ref 0–2)
IRON SATN MFR SERPL: 10 %
IRON SERPL-MCNC: 31 UG/DL (ref 50–170)
LYMPHOCYTES # BLD AUTO: 2.06 THOUSANDS/ΜL (ref 0.6–4.47)
LYMPHOCYTES NFR BLD AUTO: 32 % (ref 14–44)
MCH RBC QN AUTO: 27.4 PG (ref 26.8–34.3)
MCHC RBC AUTO-ENTMCNC: 30.2 G/DL (ref 31.4–37.4)
MCV RBC AUTO: 91 FL (ref 82–98)
MONOCYTES # BLD AUTO: 0.76 THOUSAND/ΜL (ref 0.17–1.22)
MONOCYTES NFR BLD AUTO: 12 % (ref 4–12)
NEUTROPHILS # BLD AUTO: 3.36 THOUSANDS/ΜL (ref 1.85–7.62)
NEUTS SEG NFR BLD AUTO: 50 % (ref 43–75)
NRBC BLD AUTO-RTO: 0 /100 WBCS
PLATELET # BLD AUTO: 219 THOUSANDS/UL (ref 149–390)
PMV BLD AUTO: 9.7 FL (ref 8.9–12.7)
RBC # BLD AUTO: 3.87 MILLION/UL (ref 3.81–5.12)
TIBC SERPL-MCNC: 316 UG/DL (ref 250–450)
VIT B12 SERPL-MCNC: 99 PG/ML (ref 100–900)
WBC # BLD AUTO: 6.54 THOUSAND/UL (ref 4.31–10.16)

## 2019-08-31 PROCEDURE — 82728 ASSAY OF FERRITIN: CPT | Performed by: NURSE PRACTITIONER

## 2019-08-31 PROCEDURE — 99232 SBSQ HOSP IP/OBS MODERATE 35: CPT | Performed by: INTERNAL MEDICINE

## 2019-08-31 PROCEDURE — 83540 ASSAY OF IRON: CPT | Performed by: NURSE PRACTITIONER

## 2019-08-31 PROCEDURE — 83550 IRON BINDING TEST: CPT | Performed by: NURSE PRACTITIONER

## 2019-08-31 PROCEDURE — 82607 VITAMIN B-12: CPT | Performed by: NURSE PRACTITIONER

## 2019-08-31 PROCEDURE — 85025 COMPLETE CBC W/AUTO DIFF WBC: CPT | Performed by: NURSE PRACTITIONER

## 2019-08-31 PROCEDURE — 87493 C DIFF AMPLIFIED PROBE: CPT | Performed by: NURSE PRACTITIONER

## 2019-08-31 PROCEDURE — 82746 ASSAY OF FOLIC ACID SERUM: CPT | Performed by: NURSE PRACTITIONER

## 2019-08-31 RX ORDER — BRIMONIDINE TARTRATE 0.15 %
1 DROPS OPHTHALMIC (EYE) 2 TIMES DAILY
Status: DISCONTINUED | OUTPATIENT
Start: 2019-08-31 | End: 2019-09-01 | Stop reason: HOSPADM

## 2019-08-31 RX ORDER — CIPROFLOXACIN 500 MG/1
500 TABLET, FILM COATED ORAL EVERY 12 HOURS SCHEDULED
Status: DISCONTINUED | OUTPATIENT
Start: 2019-08-31 | End: 2019-09-01 | Stop reason: HOSPADM

## 2019-08-31 RX ORDER — METRONIDAZOLE 500 MG/1
500 TABLET ORAL EVERY 8 HOURS SCHEDULED
Status: DISCONTINUED | OUTPATIENT
Start: 2019-08-31 | End: 2019-09-01 | Stop reason: HOSPADM

## 2019-08-31 RX ORDER — DORZOLAMIDE HCL 20 MG/ML
1 SOLUTION/ DROPS OPHTHALMIC 2 TIMES DAILY
Status: DISCONTINUED | OUTPATIENT
Start: 2019-08-31 | End: 2019-09-01 | Stop reason: HOSPADM

## 2019-08-31 RX ORDER — FERROUS SULFATE 325(65) MG
325 TABLET ORAL
Status: DISCONTINUED | OUTPATIENT
Start: 2019-08-31 | End: 2019-09-01 | Stop reason: HOSPADM

## 2019-08-31 RX ADMIN — CIPROFLOXACIN HYDROCHLORIDE 500 MG: 500 TABLET, FILM COATED ORAL at 13:55

## 2019-08-31 RX ADMIN — BRIMONIDINE TARTRATE 1 DROP: 1.5 SOLUTION OPHTHALMIC at 17:31

## 2019-08-31 RX ADMIN — METRONIDAZOLE 500 MG: 500 TABLET, FILM COATED ORAL at 21:44

## 2019-08-31 RX ADMIN — OXYBUTYNIN CHLORIDE 5 MG: 5 TABLET, EXTENDED RELEASE ORAL at 10:29

## 2019-08-31 RX ADMIN — DORZOLAMIDE HYDROCHLORIDE 1 DROP: 20 SOLUTION/ DROPS OPHTHALMIC at 10:35

## 2019-08-31 RX ADMIN — CIPROFLOXACIN HYDROCHLORIDE 500 MG: 500 TABLET, FILM COATED ORAL at 21:44

## 2019-08-31 RX ADMIN — AMITRIPTYLINE HYDROCHLORIDE 50 MG: 50 TABLET, FILM COATED ORAL at 21:44

## 2019-08-31 RX ADMIN — POTASSIUM CHLORIDE 10 MEQ: 750 TABLET, EXTENDED RELEASE ORAL at 17:30

## 2019-08-31 RX ADMIN — DORZOLAMIDE HCL 1 DROP: 20 SOLUTION/ DROPS OPHTHALMIC at 17:31

## 2019-08-31 RX ADMIN — PANTOPRAZOLE SODIUM 40 MG: 40 TABLET, DELAYED RELEASE ORAL at 06:11

## 2019-08-31 RX ADMIN — FUROSEMIDE 40 MG: 40 TABLET ORAL at 10:29

## 2019-08-31 RX ADMIN — TRAVOPROST 1 DROP: 0.04 SOLUTION/ DROPS OPHTHALMIC at 21:45

## 2019-08-31 RX ADMIN — NEBIVOLOL HYDROCHLORIDE 10 MG: 5 TABLET ORAL at 10:35

## 2019-08-31 RX ADMIN — FERROUS SULFATE TAB 325 MG (65 MG ELEMENTAL FE) 325 MG: 325 (65 FE) TAB at 13:55

## 2019-08-31 RX ADMIN — Medication 3.38 G: at 10:35

## 2019-08-31 RX ADMIN — ENOXAPARIN SODIUM 40 MG: 100 INJECTION SUBCUTANEOUS at 13:55

## 2019-08-31 RX ADMIN — AMLODIPINE BESYLATE 5 MG: 5 TABLET ORAL at 10:30

## 2019-08-31 RX ADMIN — METRONIDAZOLE 500 MG: 500 TABLET, FILM COATED ORAL at 13:55

## 2019-08-31 RX ADMIN — POTASSIUM CHLORIDE 10 MEQ: 750 TABLET, EXTENDED RELEASE ORAL at 10:30

## 2019-08-31 RX ADMIN — BRIMONIDINE TARTRATE 1 DROP: 1.5 SOLUTION OPHTHALMIC at 13:55

## 2019-08-31 RX ADMIN — Medication 3.38 G: at 06:00

## 2019-08-31 RX ADMIN — LISINOPRIL 40 MG: 20 TABLET ORAL at 10:30

## 2019-08-31 NOTE — PROGRESS NOTES
Pt tolerated solid foods for dinner  Denies any pain  Pt reports no diarrhea this afternoon or evening  Will cont to sarah

## 2019-08-31 NOTE — ASSESSMENT & PLAN NOTE
Bright red blood per rectum secondary to ischemic colitis  Trend CBC daily  C diff check pending  Continue with PPI  Continue a regular diet  Outpatient colonoscopy within 6-8 weeks

## 2019-08-31 NOTE — PROGRESS NOTES
Progress note - Gastroenterology   Leonidas Oppenheim 81 y o  female MRN: [id number]  Unit/Bed#: [address] [place] [address] [id number] Encounter: [id number]    ASSESSMENT and PLAN    Rectal bleeding  Assessment & Plan  27-year-old female admitted with rectal bleeding and colitis noted on CT  Likely ischemic colitis  Overall feeling better  No abdominal pain or further bleeding  Some loose stools from antibiotics, C diff is pending at this point     -follow CBC trend, currently stable  -check stool for C diff  -mild iron deficiency, we will follow as outpatient  -continue to observe rectal bleeding, melena  -will add PPI for GI prophylaxis  -currently tolerating diet  -colonoscopy 6-8 weeks to follow up the rectal bleeding and colitis    * Colitis  Assessment & Plan  Inflammatory, infectious or ischemic colitis  CT scan showed "   wall thickening throughout the colon at the splenic flexure and proximal descending colon  Diverticulosis in the sigmoid colon without diverticulitis "  No evidence of bowel perforation, obstruction or abscess noted on CT  Abdominal pain  resolved  WBC's remain normalized  Afebrile  Tolerating house diet  -currently on Zosyn, okay to switch to Cipro Flagyl for a total of 5 days of antibiotics  -will need a colonoscopy but this can be accomplished outpatient in 6-8 weeks        Chief Complaint   Patient presents with    Rectal Bleeding     To ED via EMS for evaluation of bright red rectal bleeding started last night  Denies any abd  pain, states that she had several episodes today  SUBJECTIVE/HPI   No complaints today  No bleeding  No abdominal pain  Two episodes of loose stools  C diff pending  /60 (BP Location: Right arm)   Pulse 57   Temp 98 9 °F (37 2 °C) (Oral)   Resp 14   Ht 4' 10" (1 473 m)   Wt 65 1 kg (143 lb 9 6 oz)   LMP  (LMP Unknown)   SpO2 97%   Breastfeeding?  No   BMI 30 01 kg/m²     PHYSICALEXAM  General appearance:  No acute distress, alert, appears stated age and cooperative  Head: Normocephalic, without obvious abnormality, atraumatic  Lungs: clear to auscultation bilaterally, no crackles wheezing or rales, good air movement  Heart: regular rate and rhythm, S1, S2 normal, no murmur, click, rub or gallop  Abdomen: soft, non-tender; bowel sounds normal; no masses,  no organomegaly  Extremities: extremities normal, atraumatic, no cyanosis or edema  Neurologic: Grossly normal    Lab Results   Component Value Date    CALCIUM 8 3 08/29/2019    K 4 0 08/29/2019    CO2 28 08/29/2019     08/29/2019    BUN 8 08/29/2019    CREATININE 0 56 (L) 08/29/2019     Lab Results   Component Value Date    WBC 6 54 08/31/2019    HGB 10 6 (L) 08/31/2019    HCT 35 1 08/31/2019    MCV 91 08/31/2019     08/31/2019     Lab Results   Component Value Date    ALT 21 08/28/2019    AST 19 08/28/2019    ALKPHOS 92 08/28/2019     No results found for: AMYLASE  Lab Results   Component Value Date    LIPASE 99 08/28/2019     Lab Results   Component Value Date    IRON 31 (L) 08/31/2019    TIBC 316 08/31/2019    FERRITIN 12 08/31/2019    FERRITIN 12 08/31/2019     Lab Results   Component Value Date    INR 0 89 08/28/2019       Counseling / Coordination of Care  Total floor / unit time spent today 30 minutes

## 2019-08-31 NOTE — PROGRESS NOTES
Progress Note - Marvin Mosley 1935, 81 y o  female MRN: [id number]    Unit/Bed#: [address] [place], [place] [id number] Encounter: [id number]    Primary Care Provider: Reyes Boehringer, MD   Date and time admitted to hospital: 8/28/2019  5:13 PM        Anemia, unspecified  Assessment & Plan  Anemia secondary to GI bleed, hemoglobin stable at 10  Iron levels 31 (low), ferritin 12  Start iron supplementation  Trend CBC  Continue with PPI      Rectal bleeding  Assessment & Plan  Bright red blood per rectum secondary to ischemic colitis  Trend CBC daily  C diff check pending  Continue with PPI  Continue a regular diet  Outpatient colonoscopy within 6-8 weeks    Hypokalemia  Assessment & Plan  Replete potassium  Trend BMP daily    Depression  Assessment & Plan  Continue amitriptyline    Hypertension  Assessment & Plan  · Blood pressure is stable and well controlled  · Continue Bystolic, Norvasc Lasix and lisinopril    * Colitis  Assessment & Plan  · GI bleed most likely secondary to ischemic colitis, now clinically improving  ·  Check stool for C diff, results pending  · Per GI Switch Zosyn to ciprofloxacin and Flagyl, to complete a 5 day course of antibiotic  · trend CBC daily  · Appreciate GI consult, to follow up as outpatient for colonoscopy within 6-8 weeks      VTE Pharmacologic Prophylaxis:   Pharmacologic: Enoxaparin (Lovenox)  Mechanical VTE Prophylaxis in Place: Yes    Patient Centered Rounds: I have performed bedside rounds with nursing staff today  Discussions with Specialists or Other Care Team Provider: Discussed with gastroenterology    Education and Discussions with Family / Patient:  I also discussed with patient's daughter and updated her over the phone    Time Spent for Care: 30 minutes  More than 50% of total time spent on counseling and coordination of care as described above      Current Length of Stay: 2 day(s)    Current Patient Status: Inpatient   Certification Statement: The patient will continue to require additional inpatient hospital stay due to Colitis    Discharge Plan:  Tomorrow    Code Status: Level 1 - Full Code      Subjective:   Overnight patient's loose stools improved but she had an episode of explosive diarrhea this am, and she felt lethargic    Objective:     Vitals:   Temp (24hrs), Av 1 °F (36 7 °C), Min:97 2 °F (36 2 °C), Max:98 9 °F (37 2 °C)    Temp:  [97 2 °F (36 2 °C)-98 9 °F (37 2 °C)] 98 9 °F (37 2 °C)  HR:  [56-57] 57  Resp:  [14-18] 14  BP: (131-138)/(60-65) 134/60  SpO2:  [96 %-97 %] 97 %  Body mass index is 30 01 kg/m²  Input and Output Summary (last 24 hours): Intake/Output Summary (Last 24 hours) at 2019 1302  Last data filed at 2019 0600  Gross per 24 hour   Intake    Output 500 ml   Net -500 ml       Physical Exam:     Physical Exam   Constitutional: She is oriented to person, place, and time  She appears well-developed and well-nourished  Cardiovascular: Normal rate and regular rhythm  Exam reveals no gallop and no friction rub  Murmur heard  Pulmonary/Chest: Effort normal and breath sounds normal  No stridor  No respiratory distress  She has no wheezes  She has no rales  Abdominal: Soft  Bowel sounds are normal  She exhibits no distension and no mass  There is no tenderness  There is no rebound and no guarding  Musculoskeletal: Normal range of motion  Neurological: She is alert and oriented to person, place, and time  Skin: Skin is warm  Vitals reviewed        Additional Data:     Labs:    Results from last 7 days   Lab Units 19  0512   WBC Thousand/uL 6 54   HEMOGLOBIN g/dL 10 6*   HEMATOCRIT % 35 1   PLATELETS Thousands/uL 219   NEUTROS PCT % 50   LYMPHS PCT % 32   MONOS PCT % 12   EOS PCT % 5     Results from last 7 days   Lab Units 19  0600 19  1753   SODIUM mmol/L 142 141   POTASSIUM mmol/L 4 0 3 2*   CHLORIDE mmol/L 108 104   CO2 mmol/L 28 29   BUN mg/dL 8 9   CREATININE mg/dL 0 56* 0 58*   ANION GAP mmol/L 6 8   CALCIUM mg/dL 8 3 8 8   ALBUMIN g/dL  --  3 2*   TOTAL BILIRUBIN mg/dL  --  0 50   ALK PHOS U/L  --  92   ALT U/L  --  21   AST U/L  --  19   GLUCOSE RANDOM mg/dL 98 132     Results from last 7 days   Lab Units 08/28/19  1753   INR  0 89             Results from last 7 days   Lab Units 08/28/19 2007   LACTIC ACID mmol/L 1 0           * I Have Reviewed All Lab Data Listed Above  * Additional Pertinent Lab Tests Reviewed: Surendrainglan 66 Admission Reviewed    Imaging:    Imaging Reports Reviewed Today Include:   Imaging Personally Reviewed by Myself Includes:      Recent Cultures (last 7 days):           Last 24 Hours Medication List:     Current Facility-Administered Medications:  amitriptyline 50 mg Oral HS Sidra Ocasio PA-C   amLODIPine 5 mg Oral Daily Heidimarie I MELCHOR Ocasio   brimonidine 1 drop Both Eyes BID Oren Schmitt MD   ciprofloxacin 500 mg Oral Q12H 6225 Raegan Santiago MD   dorzolamide 1 drop Both Eyes BID Oren Schmitt MD   enoxaparin 40 mg Subcutaneous Q24H [address] Oren Schmitt MD   ferrous sulfate 325 mg Oral Daily With Breakfast Oren Schmitt MD   furosemide 40 mg Oral Daily Sidra Ocasio PA-C   lisinopril 40 mg Oral Daily Heidimarie I MELCHOR Ocasio   metroNIDAZOLE 500 mg Oral Q8H [address] Oren Schmitt MD   nebivolol 10 mg Oral Daily Sidra Ocasio PA-C   oxybutynin 5 mg Oral Daily Heidimarie MINO Ocasio PA-C   pantoprazole 40 mg Oral Early Morning [place] Dean CRNP   potassium chloride 10 mEq Oral BID Sidra Ocasio PA-C   travoprost 1 drop Both Eyes HS Sidra Ocasio PA-C        Today, Patient Was Seen By: Oren Schmitt MD    ** Please Note: Dictation voice to text software may have been used in the creation of this document   **

## 2019-08-31 NOTE — ASSESSMENT & PLAN NOTE
Anemia secondary to GI bleed, hemoglobin stable at 10  Iron levels 31 (low), ferritin 12  Start iron supplementation  Trend CBC  Continue with PPI

## 2019-08-31 NOTE — ASSESSMENT & PLAN NOTE
· GI bleed most likely secondary to ischemic colitis, now clinically improving  ·  Check stool for C diff, results pending  · Per GI Switch Zosyn to ciprofloxacin and Flagyl, to complete a 5 day course of antibiotic  · trend CBC daily  · Appreciate GI consult, to follow up as outpatient for colonoscopy within 6-8 weeks

## 2019-09-01 VITALS
HEART RATE: 61 BPM | OXYGEN SATURATION: 96 % | HEIGHT: 58 IN | DIASTOLIC BLOOD PRESSURE: 66 MMHG | TEMPERATURE: 97.8 F | RESPIRATION RATE: 18 BRPM | BODY MASS INDEX: 30.08 KG/M2 | WEIGHT: 143.3 LBS | SYSTOLIC BLOOD PRESSURE: 144 MMHG

## 2019-09-01 PROBLEM — E87.6 HYPOKALEMIA: Status: RESOLVED | Noted: 2019-08-29 | Resolved: 2019-09-01

## 2019-09-01 LAB — C DIFF TOX GENS STL QL NAA+PROBE: NORMAL

## 2019-09-01 PROCEDURE — 99232 SBSQ HOSP IP/OBS MODERATE 35: CPT | Performed by: INTERNAL MEDICINE

## 2019-09-01 PROCEDURE — 99239 HOSP IP/OBS DSCHRG MGMT >30: CPT | Performed by: INTERNAL MEDICINE

## 2019-09-01 RX ORDER — PANTOPRAZOLE SODIUM 40 MG/1
40 TABLET, DELAYED RELEASE ORAL
Qty: 14 TABLET | Refills: 0 | Status: SHIPPED | OUTPATIENT
Start: 2019-09-02 | End: 2019-09-16

## 2019-09-01 RX ORDER — SACCHAROMYCES BOULARDII 250 MG
250 CAPSULE ORAL 2 TIMES DAILY
Qty: 60 CAPSULE | Refills: 0 | Status: SHIPPED | OUTPATIENT
Start: 2019-09-01 | End: 2019-10-01

## 2019-09-01 RX ORDER — SACCHAROMYCES BOULARDII 250 MG
250 CAPSULE ORAL 2 TIMES DAILY
Status: DISCONTINUED | OUTPATIENT
Start: 2019-09-01 | End: 2019-09-01 | Stop reason: HOSPADM

## 2019-09-01 RX ORDER — METRONIDAZOLE 500 MG/1
500 TABLET ORAL EVERY 8 HOURS SCHEDULED
Qty: 4 TABLET | Refills: 0 | Status: SHIPPED | OUTPATIENT
Start: 2019-09-01 | End: 2019-09-03

## 2019-09-01 RX ORDER — CIPROFLOXACIN 500 MG/1
500 TABLET, FILM COATED ORAL EVERY 12 HOURS SCHEDULED
Qty: 3 TABLET | Refills: 0 | Status: SHIPPED | OUTPATIENT
Start: 2019-09-01 | End: 2019-09-03

## 2019-09-01 RX ADMIN — PANTOPRAZOLE SODIUM 40 MG: 40 TABLET, DELAYED RELEASE ORAL at 05:37

## 2019-09-01 RX ADMIN — POTASSIUM CHLORIDE 10 MEQ: 750 TABLET, EXTENDED RELEASE ORAL at 09:29

## 2019-09-01 RX ADMIN — ENOXAPARIN SODIUM 40 MG: 100 INJECTION SUBCUTANEOUS at 09:29

## 2019-09-01 RX ADMIN — FERROUS SULFATE TAB 325 MG (65 MG ELEMENTAL FE) 325 MG: 325 (65 FE) TAB at 09:30

## 2019-09-01 RX ADMIN — METRONIDAZOLE 500 MG: 500 TABLET, FILM COATED ORAL at 05:37

## 2019-09-01 RX ADMIN — AMLODIPINE BESYLATE 5 MG: 5 TABLET ORAL at 09:29

## 2019-09-01 RX ADMIN — DORZOLAMIDE HCL 1 DROP: 20 SOLUTION/ DROPS OPHTHALMIC at 09:30

## 2019-09-01 RX ADMIN — NEBIVOLOL HYDROCHLORIDE 10 MG: 5 TABLET ORAL at 09:29

## 2019-09-01 RX ADMIN — OXYBUTYNIN CHLORIDE 5 MG: 5 TABLET, EXTENDED RELEASE ORAL at 09:30

## 2019-09-01 RX ADMIN — FUROSEMIDE 40 MG: 40 TABLET ORAL at 09:29

## 2019-09-01 RX ADMIN — BRIMONIDINE TARTRATE 1 DROP: 1.5 SOLUTION OPHTHALMIC at 09:30

## 2019-09-01 RX ADMIN — LISINOPRIL 40 MG: 20 TABLET ORAL at 09:29

## 2019-09-01 RX ADMIN — CIPROFLOXACIN HYDROCHLORIDE 500 MG: 500 TABLET, FILM COATED ORAL at 09:29

## 2019-09-01 NOTE — DISCHARGE SUMMARY
Discharge- Alma Purl 1935, 81 y o  female MRN: [id number]    Unit/Bed#: [address] [place], [place] [id number] Encounter: [id number]    Primary Care Provider: David Silvestre MD   Date and time admitted to hospital: 8/28/2019  5:13 PM        Anemia, unspecified  Assessment & Plan  Anemia secondary to GI bleed, hemoglobin stable at 10  Iron levels 31 (low), ferritin 12  Discharge on iron supplementation  Trend CBC  Continue with PPI      Rectal bleeding  Assessment & Plan  Bright red blood per rectum secondary to ischemic colitis, resolved  Trend CBC daily  C diff negative  Continue with PPI  Continue a regular diet  Outpatient colonoscopy within 6-8 weeks    Depression  Assessment & Plan  Continue amitriptyline    Hypertension  Assessment & Plan  · Blood pressure stable   · Medically stable for discharge  · Continue home dose Bystolic, Norvasc Lasix and lisinopril on discharge    * Colitis  Assessment & Plan  · GI bleed most likely secondary to ischemic colitis, now clinically improving  · Stools no longer bloody, has had only 1 episode of semi-formed stools today  · C diff negative  ·  Medically stable for discharge  · On antibiotics total day #4/5 today, Initially on Zosyn, now on ciprofloxacin and Flagyl, to complete a 5 day course of antibiotic  · trend CBC daily  · Appreciate GI consult, to follow up as outpatient for colonoscopy within 6-8 weeks        Discharging Physician / Practitioner: Arlen Caballero MD  PCP: David Silvestre MD  Admission Date:   Admission Orders (From admission, onward)     Ordered        08/29/19 1704  Inpatient Admission  Once         08/28/19 2109  Place in Observation (expected length of stay for this patient is less than two midnights)  Once                   Discharge Date: 09/01/19    Resolved Problems  Date Reviewed: 8/31/2019          Resolved    Hypokalemia 9/1/2019     Resolved by  Arlen Caballero MD          Consultations During Hospital Stay:  · Gastroenterology    Procedures Performed:   · None    Significant Findings / Test Results:   · CT abdomen pelvis  FINDINGS:    ABDOMEN    LOWER CHEST:  Clear lung bases  LIVER/BILIARY TREE:  Subcentimeter hypoattenuating lesion in the dome of the liver, too small to characterize, though likely to represent a cyst     GALLBLADDER:  Few punctate gallstones in the gallbladder fundus without evidence of acute cholecystitis  SPLEEN:  Unremarkable  PANCREAS:  Unremarkable  ADRENAL GLANDS:  Unremarkable  KIDNEYS/URETERS:  Left renal cortical 9 mm cyst   No pyelonephritis or obstructive uropathy  STOMACH AND BOWEL:  Wall thickening throughout the colon at the splenic flexure and proximal descending colon   Diverticulosis in the sigmoid colon without diverticulitis   No evidence of bowel obstruction  Right ventral omental fat-containing hernia along the rectus sheath measuring 4 cm   More superiorly located right parasagittal mental fat-containing hernia, also measuring 4 cm  APPENDIX:  Normal appendix  ABDOMINOPELVIC CAVITY:  No ascites or free intraperitoneal air  No lymphadenopathy  VESSELS:  Ectatic abdominal aorta  PELVIS    REPRODUCTIVE ORGANS:  No adnexal mass or cyst     URINARY BLADDER:  Unremarkable  ABDOMINAL WALL/INGUINAL REGIONS:  Unremarkable  OSSEOUS STRUCTURES: Upper lumbar dextroscoliosis and compensatory lower lumbar levoscoliosis   L4 anterolisthesis likely secondary to severe disc and facet degenerative change   No acute fracture or destructive osseous lesion  Impression:       Inflammatory, infectious or ischemic colitis involving the splenic flexure and proximal descending colon   No evidence of bowel perforation, obstruction or abscess  ·     Incidental Findings:   · None reported by Radiology     Test Results Pending at Discharge (will require follow up):    · None     Outpatient Tests Requested:  · Colonoscopy in 6 weeks    Complications:  None    Reason for Admission:  Bright red blood per rectum    Hospital Course:     Bradford Hawley is a 81 y o  female patient who originally presented to the hospital on 8/28/2019 due to bloody stools, and lower abdominal pain  On presentation CT scan was suggestive of colitis and she was treated with antibiotics  C diff also returned negative  Patient improved and is now stable for discharge    Please see above list of diagnoses and related plan for additional information  Condition at Discharge: stable     Discharge Day Visit / Exam:     Subjective:  Overnight patient had 1 episode of loose stools, nonbloody  She is otherwise fine and able to tolerate a diet    Vitals: Blood Pressure: 144/66 (09/01/19 0713)  Pulse: 61 (09/01/19 0713)  Temperature: 97 8 °F (36 6 °C) (09/01/19 0713)  Temp Source: Oral (09/01/19 0713)  Respirations: 18 (09/01/19 0713)  Height: 4' 10" (147 3 cm) (08/28/19 2256)  Weight - Scale: 65 kg (143 lb 4 8 oz) (09/01/19 0537)  SpO2: 96 % (09/01/19 0713)  Exam:   Physical Exam   Constitutional: She appears well-developed and well-nourished  Cardiovascular: Normal rate and regular rhythm  Murmur heard  Systolic murmur is present with a grade of 2/6  Pulmonary/Chest: Effort normal and breath sounds normal  No stridor  No respiratory distress  She has no wheezes  She has no rales  Abdominal: Soft  Bowel sounds are normal  She exhibits no distension and no mass  There is no tenderness  There is no rebound and no guarding  Obese abdomen   Skin: Skin is warm  Psychiatric: She has a normal mood and affect  Vitals reviewed  Discussion with Family:  I discussed with patient's daughter over the phone    Discharge instructions/Information to patient and family:   See after visit summary for information provided to patient and family  Provisions for Follow-Up Care:  See after visit summary for information related to follow-up care and any pertinent home health orders        Disposition:     Home    For [facility]charg[facility]:   · Not Applicable to this Patient - Not Applicable to this Patient    Planned Readmission:  No     Discharge Statement: This time was spent on the day of discharge  I had direct contact with the patient on the day of discharge  Greater than 50% of the total time was spent examining patient, answering all patient questions, arranging and discussing plan of care with patient as well as directly providing post-discharge instructions  Additional time then spent on discharge activities  Discharge Medications:  See after visit summary for reconciled discharge medications provided to patient and family        ** Please Note: This note has been constructed using a voice recognition system **

## 2019-09-01 NOTE — ASSESSMENT & PLAN NOTE
Anemia secondary to GI bleed, hemoglobin stable at 10  Iron levels 31 (low), ferritin 12  Discharge on iron supplementation  Trend CBC  Continue with PPI

## 2019-09-01 NOTE — ASSESSMENT & PLAN NOTE
· Blood pressure stable   · Medically stable for discharge  · Continue home dose Bystolic, Norvasc Lasix and lisinopril on discharge

## 2019-09-01 NOTE — ASSESSMENT & PLAN NOTE
· GI bleed most likely secondary to ischemic colitis, now clinically improving  · Stools no longer bloody, has had only 1 episode of semi-formed stools today  · C diff negative  ·  Medically stable for discharge  · On antibiotics total day #4/5 today, Initially on Zosyn, now on ciprofloxacin and Flagyl, to complete a 5 day course of antibiotic  · trend CBC daily  · Appreciate GI consult, to follow up as outpatient for colonoscopy within 6-8 weeks

## 2019-09-01 NOTE — ASSESSMENT & PLAN NOTE
Bright red blood per rectum secondary to ischemic colitis, resolved  Trend CBC daily  C diff negative  Continue with PPI  Continue a regular diet  Outpatient colonoscopy within 6-8 weeks

## 2019-09-01 NOTE — DISCHARGE INSTR - AVS FIRST PAGE
Dear Belem Sahni,     It was our pleasure to care for you here at [facility]   It is our hope that we were always able to meet and exceed the expected standards for your care during your stay  You were hospitalized due to bloody stools and abdominal pain  CT scan abdomen showed colitis and you were started on IV antibiotics and you improved  You were seen and managed by the gastroenterologist       Yesika Aguilera were cared for on the general medical floor under the service of Richy Broussard MD with the [facility] who covers for your primary care physician (PCP), Reji Escobar MD, while you were hospitalized  If you have any questions or concerns related to this hospitalization, you may contact us at [phone]  For follow up, we recommend that you follow up with your primary care physician  Please review this entire discharge summary as additional general instructions may be provided later as well  However, at this time we provide for you here, the most important instructions / recommendations at discharge:     · Follow up with your primary care doctor within 1 week  · Follow up with the gastroenterologist and get a colonoscopy done within 6-8 weeks    New medications  · You are being discharged on pantoprazole for 2 weeks  · Oral probiotics to help with the colitis  · Take Oral ciprofloxacin and metronidazole for 1 more day, STOP 09/02/19        Sincerely,     Richy Broussard MD

## 2019-09-01 NOTE — PROGRESS NOTES
Progress note - Gastroenterology   Paresh Contreras 81 y o  female MRN: [id number]  Unit/Bed#: [address] [place] [address] [id number] Encounter: [id number]    ASSESSMENT and PLAN    Rectal bleeding  Assessment & Plan  80-year-old female admitted with rectal bleeding and colitis noted on CT  Likely ischemic colitis  Overall feeling better  No abdominal pain or further bleeding  Some loose stools from antibiotics, C diff is still pending at this point      -follow CBC trend, currently stable  -check stool for C diff, if negative, recommend discharge on probiotics   -mild iron deficiency, we will follow as outpatient  -continue to observe rectal bleeding, melena  -will add PPI for GI prophylaxis  -currently tolerating diet  -colonoscopy 6-8 weeks to follow up the rectal bleeding and colitis     * Colitis  Assessment & Plan  Inflammatory, infectious or ischemic colitis  CT scan showed "   wall thickening throughout the colon at the splenic flexure and proximal descending colon   Diverticulosis in the sigmoid colon without diverticulitis "  No evidence of bowel perforation, obstruction or abscess noted on CT  Abdominal pain  resolved  WBC's remain normalized  Afebrile  Tolerating house diet       -currently on Zosyn, okay to switch to Cipro Flagyl for a total of 5 days of antibiotics  -will need a colonoscopy but this can be accomplished outpatient in 6-8 weeks    Chief Complaint   Patient presents with    Rectal Bleeding     To ED via EMS for evaluation of bright red rectal bleeding started last night  Denies any abd  pain, states that she had several episodes today  SUBJECTIVE/HPI   No complaints, tolerating regular house diet, still had 2 loose bowel movements overnight    /66 (BP Location: Right arm)   Pulse 61   Temp 97 8 °F (36 6 °C) (Oral)   Resp 18   Ht 4' 10" (1 473 m)   Wt 65 kg (143 lb 4 8 oz)   LMP  (LMP Unknown)   SpO2 96%   Breastfeeding?  No   BMI 29 95 kg/m²     PHYSICALEXAM  General appearance: alert, appears stated age and cooperative  Head: Normocephalic, without obvious abnormality, atraumatic  Lungs: clear to auscultation bilaterally, no crackles wheezing or rales   Heart: regular rate and rhythm, S1, S2 normal, no murmur, click, rub or gallop  Abdomen: soft, non-tender; bowel sounds normal; no masses,  no organomegaly, small umbilical hernia  Extremities: extremities normal, atraumatic, no cyanosis or edema  Neurologic: Grossly normal    Lab Results   Component Value Date    CALCIUM 8 3 08/29/2019    K 4 0 08/29/2019    CO2 28 08/29/2019     08/29/2019    BUN 8 08/29/2019    CREATININE 0 56 (L) 08/29/2019     Lab Results   Component Value Date    WBC 6 54 08/31/2019    HGB 10 6 (L) 08/31/2019    HCT 35 1 08/31/2019    MCV 91 08/31/2019     08/31/2019     Lab Results   Component Value Date    ALT 21 08/28/2019    AST 19 08/28/2019    ALKPHOS 92 08/28/2019     No results found for: AMYLASE  Lab Results   Component Value Date    LIPASE 99 08/28/2019     Lab Results   Component Value Date    IRON 31 (L) 08/31/2019    TIBC 316 08/31/2019    FERRITIN 12 08/31/2019    FERRITIN 12 08/31/2019     Lab Results   Component Value Date    INR 0 89 08/28/2019       Counseling / Coordination of Care  Total floor / unit time spent today 30 minutes

## 2019-09-01 NOTE — DISCHARGE INSTRUCTIONS
Colitis   WHAT YOU NEED TO KNOW:   Colitis is swelling and irritation of your colon  Colitis may be caused by ulcers or a problem with your immune system  Bacteria, a virus, or a parasite may also cause colitis  The cause may not be known  You may have diarrhea, abdominal pain, fever, or blood or mucus in your bowel movement  DISCHARGE INSTRUCTIONS:   Return to the emergency department if:   · You have sudden trouble breathing  · Your bowel movements are black or have blood in them  · You have blood in your vomit  · You have severe abdominal pain or your abdomen is swollen and feels hard  · You have any of the following signs of dehydration:     ¨ Dizziness or weakness    ¨ Dry mouth, cracked lips, or severe thirst    ¨ Fast heartbeat or breathing    ¨ Urinating very little or not at all  Contact your healthcare provider if:   · Your symptoms get worse or do not go away  · You have a fever, chills, cough, or feel weak and achy  · You suddenly lose weight without trying  · You have questions or concerns about your condition or care  Medicines:   · Take your medicine as directed  Contact your healthcare provider if you think your medicine is not helping or if you have side effects  Tell him of her if you are allergic to any medicine  Keep a list of the medicines, vitamins, and herbs you take  Include the amounts, and when and why you take them  Bring the list or the pill bottles to follow-up visits  Carry your medicine list with you in case of an emergency  Manage your symptoms:   · Drink liquids as directed  to help prevent dehydration  Good liquids to drink include water, juice, and broth  Ask how much liquid to drink each day  You may need to drink an oral rehydration solution (ORS)  An ORS contains a balance of water, salt, and sugar to replace body fluids lost during diarrhea  · Eat a variety of healthy foods    Healthy foods include fruits, vegetables, whole-grain breads, beans, low-fat dairy products, lean meats, and fish  You may need to eat several small meals throughout the day instead of large meals  Avoid spicy foods, caffeine, chocolate, and foods high in fat  · Talk to your healthcare provider before you take NSAIDs  NSAIDs can cause worsen your symptoms if ulcers are causing your colitis  · Start to exercise when you feel better  Regular exercise helps your bowels work normally  Ask about the best exercise plan for you  Follow up with your healthcare provider as directed: You may need to return for a colonoscopy or other tests  Write down how often you have a bowel movements and what they look like  Bring this to your follow-up visits  Write down your questions so you remember to ask them during your visits  © 2017 [facility] Information is for End User's use only and may not be sold, redistributed or otherwise used for commercial purposes  All illustrations and images included in CareNotes® are the copyrighted property of A D A M , Inc  or Marcin Horn  The above information is an  only  It is not intended as medical advice for individual conditions or treatments  Talk to your doctor, nurse or pharmacist before following any medical regimen to see if it is safe and effective for you

## 2019-09-01 NOTE — PLAN OF CARE
Problem: Potential for Falls  Goal: Patient will remain free of falls  Description  INTERVENTIONS:  - Assess patient frequently for physical needs  -  Identify cognitive and physical deficits and behaviors that affect risk of falls    -  [place] fall precautions as indicated by assessment   - Educate patient/family on patient safety including physical limitations  - Instruct patient to call for assistance with activity based on assessment  - Modify environment to reduce risk of injury  - Consider OT/PT consult to assist with strengthening/mobility  Outcome: Progressing

## 2019-09-09 ENCOUNTER — PREP FOR PROCEDURE (OUTPATIENT)
Dept: GASTROENTEROLOGY | Facility: CLINIC | Age: 84
End: 2019-09-09

## 2019-09-09 ENCOUNTER — TELEPHONE (OUTPATIENT)
Dept: GASTROENTEROLOGY | Facility: CLINIC | Age: 84
End: 2019-09-09

## 2019-09-09 DIAGNOSIS — K52.9 COLITIS: Primary | ICD-10-CM

## 2019-09-09 DIAGNOSIS — K62.5 RECTAL BLEEDING: ICD-10-CM

## 2019-09-09 NOTE — TELEPHONE ENCOUNTER
Pt scheduled 6-8 weeks f/u colon on 10/30/19 in [facility] with Dr Kristine Rodriguez at 7:30

## 2019-09-10 NOTE — TELEPHONE ENCOUNTER
Pt called to reschedule prep and procedure dates-prep in now 11/01 at 4 pm and colonoscopy is now 11/14/19 in [facility] with Dr Keri Benitez, pt is put in the 8 am time slot

## 2019-09-27 ENCOUNTER — APPOINTMENT (OUTPATIENT)
Dept: LAB | Facility: CLINIC | Age: 84
End: 2019-09-27
Payer: MEDICARE

## 2019-09-27 ENCOUNTER — TRANSCRIBE ORDERS (OUTPATIENT)
Dept: ADMINISTRATIVE | Facility: HOSPITAL | Age: 84
End: 2019-09-27

## 2019-09-27 DIAGNOSIS — R73.03 PRE-DIABETES: ICD-10-CM

## 2019-09-27 DIAGNOSIS — E66.9 OBESITY, UNSPECIFIED CLASSIFICATION, UNSPECIFIED OBESITY TYPE, UNSPECIFIED WHETHER SERIOUS COMORBIDITY PRESENT: ICD-10-CM

## 2019-09-27 DIAGNOSIS — I10 HYPERTENSION, ESSENTIAL: ICD-10-CM

## 2019-09-27 DIAGNOSIS — I10 HYPERTENSION, ESSENTIAL: Primary | ICD-10-CM

## 2019-09-27 LAB
ALBUMIN SERPL BCP-MCNC: 3.7 G/DL (ref 3.5–5)
ALP SERPL-CCNC: 99 U/L (ref 46–116)
ALT SERPL W P-5'-P-CCNC: 24 U/L (ref 12–78)
ANION GAP SERPL CALCULATED.3IONS-SCNC: 3 MMOL/L (ref 4–13)
AST SERPL W P-5'-P-CCNC: 18 U/L (ref 5–45)
BASOPHILS # BLD AUTO: 0.05 THOUSANDS/ΜL (ref 0–0.1)
BASOPHILS NFR BLD AUTO: 1 % (ref 0–1)
BILIRUB SERPL-MCNC: 0.42 MG/DL (ref 0.2–1)
BUN SERPL-MCNC: 13 MG/DL (ref 5–25)
CALCIUM SERPL-MCNC: 9.2 MG/DL (ref 8.3–10.1)
CHLORIDE SERPL-SCNC: 108 MMOL/L (ref 100–108)
CHOLEST SERPL-MCNC: 172 MG/DL (ref 50–200)
CO2 SERPL-SCNC: 31 MMOL/L (ref 21–32)
CREAT SERPL-MCNC: 0.66 MG/DL (ref 0.6–1.3)
EOSINOPHIL # BLD AUTO: 0.1 THOUSAND/ΜL (ref 0–0.61)
EOSINOPHIL NFR BLD AUTO: 2 % (ref 0–6)
ERYTHROCYTE [DISTWIDTH] IN BLOOD BY AUTOMATED COUNT: 14.2 % (ref 11.6–15.1)
EST. AVERAGE GLUCOSE BLD GHB EST-MCNC: 131 MG/DL
GFR SERPL CREATININE-BSD FRML MDRD: 82 ML/MIN/1.73SQ M
GLUCOSE P FAST SERPL-MCNC: 113 MG/DL (ref 65–99)
HBA1C MFR BLD: 6.2 % (ref 4.2–6.3)
HCT VFR BLD AUTO: 40.8 % (ref 34.8–46.1)
HDLC SERPL-MCNC: 56 MG/DL (ref 40–60)
HGB BLD-MCNC: 12.1 G/DL (ref 11.5–15.4)
IMM GRANULOCYTES # BLD AUTO: 0.02 THOUSAND/UL (ref 0–0.2)
IMM GRANULOCYTES NFR BLD AUTO: 0 % (ref 0–2)
LDLC SERPL CALC-MCNC: 98 MG/DL (ref 0–100)
LYMPHOCYTES # BLD AUTO: 1.98 THOUSANDS/ΜL (ref 0.6–4.47)
LYMPHOCYTES NFR BLD AUTO: 31 % (ref 14–44)
MCH RBC QN AUTO: 26.9 PG (ref 26.8–34.3)
MCHC RBC AUTO-ENTMCNC: 29.7 G/DL (ref 31.4–37.4)
MCV RBC AUTO: 91 FL (ref 82–98)
MONOCYTES # BLD AUTO: 0.67 THOUSAND/ΜL (ref 0.17–1.22)
MONOCYTES NFR BLD AUTO: 11 % (ref 4–12)
NEUTROPHILS # BLD AUTO: 3.51 THOUSANDS/ΜL (ref 1.85–7.62)
NEUTS SEG NFR BLD AUTO: 55 % (ref 43–75)
NONHDLC SERPL-MCNC: 116 MG/DL
NRBC BLD AUTO-RTO: 0 /100 WBCS
PLATELET # BLD AUTO: 270 THOUSANDS/UL (ref 149–390)
PMV BLD AUTO: 10.5 FL (ref 8.9–12.7)
POTASSIUM SERPL-SCNC: 4.4 MMOL/L (ref 3.5–5.3)
PROT SERPL-MCNC: 6.9 G/DL (ref 6.4–8.2)
RBC # BLD AUTO: 4.49 MILLION/UL (ref 3.81–5.12)
SODIUM SERPL-SCNC: 142 MMOL/L (ref 136–145)
TRIGL SERPL-MCNC: 88 MG/DL
WBC # BLD AUTO: 6.33 THOUSAND/UL (ref 4.31–10.16)

## 2019-09-27 PROCEDURE — 83036 HEMOGLOBIN GLYCOSYLATED A1C: CPT

## 2019-09-27 PROCEDURE — 85025 COMPLETE CBC W/AUTO DIFF WBC: CPT

## 2019-09-27 PROCEDURE — 36415 COLL VENOUS BLD VENIPUNCTURE: CPT

## 2019-09-27 PROCEDURE — 80061 LIPID PANEL: CPT

## 2019-09-27 PROCEDURE — 80053 COMPREHEN METABOLIC PANEL: CPT

## 2019-10-18 ENCOUNTER — TRANSCRIBE ORDERS (OUTPATIENT)
Dept: ADMINISTRATIVE | Facility: HOSPITAL | Age: 84
End: 2019-10-18

## 2019-10-18 ENCOUNTER — HOSPITAL ENCOUNTER (OUTPATIENT)
Dept: ULTRASOUND IMAGING | Facility: CLINIC | Age: 84
Discharge: HOME | End: 2019-10-18
Payer: MEDICARE

## 2019-10-18 DIAGNOSIS — N39.0 URINARY TRACT INFECTION WITHOUT HEMATURIA, SITE UNSPECIFIED: Primary | ICD-10-CM

## 2019-10-18 DIAGNOSIS — N39.0 URINARY TRACT INFECTION WITHOUT HEMATURIA, SITE UNSPECIFIED: ICD-10-CM

## 2019-10-18 PROCEDURE — 76770 US EXAM ABDO BACK WALL COMP: CPT

## 2019-11-01 ENCOUNTER — CLINICAL SUPPORT (OUTPATIENT)
Dept: GASTROENTEROLOGY | Facility: CLINIC | Age: 84
End: 2019-11-01

## 2019-11-01 VITALS — WEIGHT: 145 LBS | HEIGHT: 58 IN | BODY MASS INDEX: 30.44 KG/M2

## 2019-11-01 DIAGNOSIS — K52.9 COLITIS: Primary | ICD-10-CM

## 2019-11-01 NOTE — PROGRESS NOTES
Pt seen for colon prep dx Colitis  Meds reviewed with pt  Instructions given  Suprep rx sent to pharmacy   Colon [facility] 11/14/19 GS

## 2019-11-14 ENCOUNTER — HOSPITAL ENCOUNTER (OUTPATIENT)
Dept: GASTROENTEROLOGY | Facility: HOSPITAL | Age: 84
Setting detail: OUTPATIENT SURGERY
Discharge: HOME | End: 2019-11-14
Attending: INTERNAL MEDICINE | Admitting: INTERNAL MEDICINE
Payer: MEDICARE

## 2019-11-14 ENCOUNTER — ANESTHESIA EVENT (OUTPATIENT)
Dept: GASTROENTEROLOGY | Facility: HOSPITAL | Age: 84
End: 2019-11-14

## 2019-11-14 ENCOUNTER — ANESTHESIA (OUTPATIENT)
Dept: GASTROENTEROLOGY | Facility: HOSPITAL | Age: 84
End: 2019-11-14

## 2019-11-14 VITALS
BODY MASS INDEX: 30.44 KG/M2 | HEART RATE: 81 BPM | RESPIRATION RATE: 18 BRPM | OXYGEN SATURATION: 96 % | DIASTOLIC BLOOD PRESSURE: 81 MMHG | WEIGHT: 145 LBS | HEIGHT: 58 IN | SYSTOLIC BLOOD PRESSURE: 148 MMHG | TEMPERATURE: 98.2 F

## 2019-11-14 DIAGNOSIS — K52.9 COLITIS: ICD-10-CM

## 2019-11-14 DIAGNOSIS — K62.5 RECTAL BLEEDING: ICD-10-CM

## 2019-11-14 PROCEDURE — 88305 TISSUE EXAM BY PATHOLOGIST: CPT | Performed by: PATHOLOGY

## 2019-11-14 PROCEDURE — 45380 COLONOSCOPY AND BIOPSY: CPT | Performed by: INTERNAL MEDICINE

## 2019-11-14 RX ORDER — SODIUM CHLORIDE 9 MG/ML
20 INJECTION, SOLUTION INTRAVENOUS CONTINUOUS
Status: DISCONTINUED | OUTPATIENT
Start: 2019-11-14 | End: 2019-11-18 | Stop reason: HOSPADM

## 2019-11-14 RX ORDER — SODIUM CHLORIDE 9 MG/ML
75 INJECTION, SOLUTION INTRAVENOUS CONTINUOUS
Status: DISCONTINUED | OUTPATIENT
Start: 2019-11-14 | End: 2019-11-18 | Stop reason: HOSPADM

## 2019-11-14 RX ORDER — PROPOFOL 10 MG/ML
INJECTION, EMULSION INTRAVENOUS AS NEEDED
Status: DISCONTINUED | OUTPATIENT
Start: 2019-11-14 | End: 2019-11-14 | Stop reason: SURG

## 2019-11-14 RX ADMIN — PROPOFOL 20 MG: 10 INJECTION, EMULSION INTRAVENOUS at 09:13

## 2019-11-14 RX ADMIN — PROPOFOL 10 MG: 10 INJECTION, EMULSION INTRAVENOUS at 09:02

## 2019-11-14 RX ADMIN — PROPOFOL 20 MG: 10 INJECTION, EMULSION INTRAVENOUS at 08:52

## 2019-11-14 RX ADMIN — PROPOFOL 20 MG: 10 INJECTION, EMULSION INTRAVENOUS at 08:59

## 2019-11-14 RX ADMIN — PROPOFOL 20 MG: 10 INJECTION, EMULSION INTRAVENOUS at 09:06

## 2019-11-14 RX ADMIN — PROPOFOL 30 MG: 10 INJECTION, EMULSION INTRAVENOUS at 08:49

## 2019-11-14 RX ADMIN — SODIUM CHLORIDE: 0.9 INJECTION, SOLUTION INTRAVENOUS at 08:47

## 2019-11-14 RX ADMIN — PROPOFOL 20 MG: 10 INJECTION, EMULSION INTRAVENOUS at 09:04

## 2019-11-14 RX ADMIN — PROPOFOL 20 MG: 10 INJECTION, EMULSION INTRAVENOUS at 09:09

## 2019-11-14 RX ADMIN — PROPOFOL 20 MG: 10 INJECTION, EMULSION INTRAVENOUS at 08:54

## 2019-11-14 RX ADMIN — PROPOFOL 20 MG: 10 INJECTION, EMULSION INTRAVENOUS at 08:50

## 2019-11-14 RX ADMIN — PROPOFOL 10 MG: 10 INJECTION, EMULSION INTRAVENOUS at 09:01

## 2019-11-14 RX ADMIN — PROPOFOL 20 MG: 10 INJECTION, EMULSION INTRAVENOUS at 08:56

## 2019-11-14 NOTE — H&P
History and Physical -  [facility]  Rakesh Ford 81 y o  female MRN: [id number]                  HPI: Rakesh Ford is a 81y o  year old female who presents for colonoscopy for rectal bleeding  REVIEW OF SYSTEMS: Per the HPI, and otherwise unremarkable  Historical Information   Past Medical History:   Diagnosis Date    Cardiac disease     CHF [facility]congestive heart failure) ([facility] [address] )     Colitis     Hypertension     Psychiatric disorder     depression     Past Surgical History:   Procedure Laterality Date    COLONOSCOPY  09/27/2010    VAGINAL DELIVERY      x2     Social History   Social History     Substance and Sexual Activity   Alcohol Use Never     Social History     Substance and Sexual Activity   Drug Use Never     Social History     Tobacco Use   Smoking Status Never Smoker   Smokeless Tobacco Never Used     History reviewed  No pertinent family history      Meds/Allergies     Current Outpatient Medications:     ALPHAGAN P 0 1 %    amitriptyline (ELAVIL) 50 mg tablet    amLODIPine (NORVASC) 5 mg tablet    dorzolamide (TRUSOPT) 2 % ophthalmic solution    lisinopril (ZESTRIL) 40 mg tablet    meclizine (ANTIVERT) 12 5 MG tablet    MYRBETRIQ 25 MG TB24    Na Sulfate-K Sulfate-Mg Sulf (SUPREP BOWEL PREP KIT) 17 5-3 13-1 6 GM/177ML SOLN    nebivolol (BYSTOLIC) 10 mg tablet    travoprost (TRAVATAN Z) 0 004 % ophthalmic solution    pantoprazole (PROTONIX) 40 mg tablet    Current Facility-Administered Medications:     sodium chloride 0 9 % infusion, 75 mL/hr, Intravenous, Continuous    sodium chloride 0 9 % infusion, 20 mL/hr, Intravenous, Continuous    No Known Allergies    Objective     /74   Pulse 87   Temp 98 2 °F (36 8 °C) (Oral)   Resp 18   Ht 4' 10" (1 473 m)   Wt 65 8 kg (145 lb)   LMP  (LMP Unknown)   SpO2 97%   BMI 30 31 kg/m²       PHYSICAL EXAM    Gen: NAD AAOx3  CV: S1S2 RRR no m/r/g  CHEST: Clear b/l no c/r/w  ABD: +BS soft, NT/ND  EXT: no edema      ASSESSMENT/PLAN:  This is a 81y o  year old female here for colonoscopy, and she is stable and optimized for her procedure  Rectal bleeding  Assessment & Plan  52-year-old female admitted with rectal bleeding and colitis noted on CT   Likely ischemic colitis   Overall feeling better   No abdominal pain or further bleeding     * Colitis  Assessment & Plan  Inflammatory, infectious or ischemic colitis   CT scan showed "   wall thickening throughout the colon at the splenic flexure and proximal descending colon   Diverticulosis in the sigmoid colon without diverticulitis  "  No evidence of bowel perforation, obstruction or abscess noted on CT   Abdominal pain  resolved   WBC's remain normalized  Coleen Matte   Tolerating house diet  s/p Abx

## 2019-11-14 NOTE — ANESTHESIA PREPROCEDURE EVALUATION
Review of Systems/Medical History  Patient summary reviewed  Chart reviewed      Cardiovascular  EKG reviewed, Negative cardio ROS Exercise tolerance (METS): >4,  Hypertension controlled, CHF ,    Pulmonary  Shortness of breath,        GI/Hepatic    GI bleeding , history of, Bowel prep  Comment: Hx colitis, rectal bleeding     Negative  ROS        Endo/Other  Negative endo/other ROS      GYN  Negative gynecology ROS          Hematology  Anemia acute blood loss anemia,     Musculoskeletal    Arthritis     Neurology    Headaches,   Comment: vertigo Psychology   Depression ,              Physical Exam    Airway    Mallampati score: I  TM Distance: >3 FB  Neck ROM: full     Dental   upper dentures and lower dentures,     Cardiovascular  Comment: Negative ROS, Rhythm: regular, Rate: normal, Cardiovascular exam normal    Pulmonary  Pulmonary exam normal Breath sounds clear to auscultation,     Other Findings        Anesthesia Plan  ASA Score- 3     Anesthesia Type- IV sedation with anesthesia with ASA Monitors  Additional Monitors:   Airway Plan:         Plan Factors-    Induction- intravenous  Postoperative Plan-     Informed Consent- Anesthetic plan and risks discussed with patient

## 2019-11-21 NOTE — RESULT ENCOUNTER NOTE
No recall due to age  Called patient to discuss, no evidence of microscopic colitis  No answer  Unable to leave a voicemail  Mailbox unavailable

## 2019-12-06 ENCOUNTER — TELEPHONE (OUTPATIENT)
Dept: GASTROENTEROLOGY | Facility: CLINIC | Age: 84
End: 2019-12-06

## 2019-12-06 NOTE — TELEPHONE ENCOUNTER
Dimitri Quintero reports Iram Cee from Dr Sandy Benítez office called saying they were told to share colon results on this Pt  Primary office wants this office to do so since procedure done by this practice

## 2020-06-15 ENCOUNTER — APPOINTMENT (OUTPATIENT)
Dept: LAB | Facility: CLINIC | Age: 85
End: 2020-06-15
Payer: MEDICARE

## 2020-06-15 ENCOUNTER — TRANSCRIBE ORDERS (OUTPATIENT)
Dept: ADMINISTRATIVE | Facility: HOSPITAL | Age: 85
End: 2020-06-15

## 2020-06-15 DIAGNOSIS — I10 HYPERTENSION, ESSENTIAL: ICD-10-CM

## 2020-06-15 DIAGNOSIS — I10 HYPERTENSION, ESSENTIAL: Primary | ICD-10-CM

## 2020-06-15 LAB
ANION GAP SERPL CALCULATED.3IONS-SCNC: 5 MMOL/L (ref 4–13)
BUN SERPL-MCNC: 10 MG/DL (ref 5–25)
CALCIUM SERPL-MCNC: 9 MG/DL (ref 8.3–10.1)
CHLORIDE SERPL-SCNC: 110 MMOL/L (ref 100–108)
CO2 SERPL-SCNC: 26 MMOL/L (ref 21–32)
CREAT SERPL-MCNC: 0.61 MG/DL (ref 0.6–1.3)
GFR SERPL CREATININE-BSD FRML MDRD: 84 ML/MIN/1.73SQ M
GLUCOSE P FAST SERPL-MCNC: 139 MG/DL (ref 65–99)
POTASSIUM SERPL-SCNC: 4.1 MMOL/L (ref 3.5–5.3)
SODIUM SERPL-SCNC: 141 MMOL/L (ref 136–145)

## 2020-06-15 PROCEDURE — 80048 BASIC METABOLIC PNL TOTAL CA: CPT

## 2020-06-15 PROCEDURE — 36415 COLL VENOUS BLD VENIPUNCTURE: CPT

## 2020-07-17 ENCOUNTER — APPOINTMENT (OUTPATIENT)
Dept: LAB | Facility: CLINIC | Age: 85
End: 2020-07-17
Payer: MEDICARE

## 2020-07-17 ENCOUNTER — TRANSCRIBE ORDERS (OUTPATIENT)
Dept: ADMINISTRATIVE | Facility: HOSPITAL | Age: 85
End: 2020-07-17

## 2020-07-17 DIAGNOSIS — I10 HYPERTENSION, ESSENTIAL: Primary | ICD-10-CM

## 2020-07-17 DIAGNOSIS — I10 HYPERTENSION, ESSENTIAL: ICD-10-CM

## 2020-07-17 LAB
ANION GAP SERPL CALCULATED.3IONS-SCNC: 3 MMOL/L (ref 4–13)
BUN SERPL-MCNC: 10 MG/DL (ref 5–25)
CALCIUM SERPL-MCNC: 9.3 MG/DL (ref 8.3–10.1)
CHLORIDE SERPL-SCNC: 107 MMOL/L (ref 100–108)
CO2 SERPL-SCNC: 32 MMOL/L (ref 21–32)
CREAT SERPL-MCNC: 0.6 MG/DL (ref 0.6–1.3)
GFR SERPL CREATININE-BSD FRML MDRD: 84 ML/MIN/1.73SQ M
GLUCOSE P FAST SERPL-MCNC: 98 MG/DL (ref 65–99)
POTASSIUM SERPL-SCNC: 3.8 MMOL/L (ref 3.5–5.3)
SODIUM SERPL-SCNC: 142 MMOL/L (ref 136–145)

## 2020-07-17 PROCEDURE — 80048 BASIC METABOLIC PNL TOTAL CA: CPT

## 2020-07-17 PROCEDURE — 36415 COLL VENOUS BLD VENIPUNCTURE: CPT

## 2020-09-28 ENCOUNTER — TRANSCRIBE ORDERS (OUTPATIENT)
Dept: ADMINISTRATIVE | Facility: HOSPITAL | Age: 85
End: 2020-09-28

## 2020-09-28 DIAGNOSIS — R42 DIZZINESSES: Primary | ICD-10-CM

## 2020-10-22 ENCOUNTER — TRANSCRIBE ORDERS (OUTPATIENT)
Dept: ADMINISTRATIVE | Facility: HOSPITAL | Age: 85
End: 2020-10-22

## 2020-10-22 ENCOUNTER — LAB (OUTPATIENT)
Dept: LAB | Facility: CLINIC | Age: 85
End: 2020-10-22
Payer: MEDICARE

## 2020-10-22 DIAGNOSIS — Z23 NEED FOR PROPHYLACTIC VACCINATION AND INOCULATION AGAINST CHOLERA ALONE: ICD-10-CM

## 2020-10-22 DIAGNOSIS — N32.81 DETRUSOR INSTABILITY OF BLADDER: ICD-10-CM

## 2020-10-22 DIAGNOSIS — K55.9 ISCHEMIC BOWEL DISEASE (HCC): ICD-10-CM

## 2020-10-22 DIAGNOSIS — I77.1 STRICTURE OF ARTERY (HCC): ICD-10-CM

## 2020-10-22 DIAGNOSIS — R60.0 LOCALIZED EDEMA: ICD-10-CM

## 2020-10-22 DIAGNOSIS — E66.9 OBESITY, UNSPECIFIED CLASSIFICATION, UNSPECIFIED OBESITY TYPE, UNSPECIFIED WHETHER SERIOUS COMORBIDITY PRESENT: ICD-10-CM

## 2020-10-22 DIAGNOSIS — F41.9 ANXIETY HYPERVENTILATION: ICD-10-CM

## 2020-10-22 DIAGNOSIS — I10 HYPERTENSION, ESSENTIAL: Primary | ICD-10-CM

## 2020-10-22 DIAGNOSIS — F45.8 ANXIETY HYPERVENTILATION: ICD-10-CM

## 2020-10-22 DIAGNOSIS — I10 HYPERTENSION, ESSENTIAL: ICD-10-CM

## 2020-10-22 LAB
ALBUMIN SERPL BCP-MCNC: 3.5 G/DL (ref 3.5–5)
ALP SERPL-CCNC: 98 U/L (ref 46–116)
ALT SERPL W P-5'-P-CCNC: 21 U/L (ref 12–78)
ANION GAP SERPL CALCULATED.3IONS-SCNC: 3 MMOL/L (ref 4–13)
AST SERPL W P-5'-P-CCNC: 16 U/L (ref 5–45)
BASOPHILS # BLD AUTO: 0.07 THOUSANDS/ΜL (ref 0–0.1)
BASOPHILS NFR BLD AUTO: 1 % (ref 0–1)
BILIRUB SERPL-MCNC: 0.44 MG/DL (ref 0.2–1)
BUN SERPL-MCNC: 10 MG/DL (ref 5–25)
CALCIUM SERPL-MCNC: 9.4 MG/DL (ref 8.3–10.1)
CHLORIDE SERPL-SCNC: 109 MMOL/L (ref 100–108)
CHOLEST SERPL-MCNC: 200 MG/DL (ref 50–200)
CO2 SERPL-SCNC: 31 MMOL/L (ref 21–32)
CREAT SERPL-MCNC: 0.53 MG/DL (ref 0.6–1.3)
EOSINOPHIL # BLD AUTO: 0.14 THOUSAND/ΜL (ref 0–0.61)
EOSINOPHIL NFR BLD AUTO: 2 % (ref 0–6)
ERYTHROCYTE [DISTWIDTH] IN BLOOD BY AUTOMATED COUNT: 12.8 % (ref 11.6–15.1)
EST. AVERAGE GLUCOSE BLD GHB EST-MCNC: 123 MG/DL
GFR SERPL CREATININE-BSD FRML MDRD: 87 ML/MIN/1.73SQ M
GLUCOSE P FAST SERPL-MCNC: 103 MG/DL (ref 65–99)
HBA1C MFR BLD: 5.9 %
HCT VFR BLD AUTO: 44.9 % (ref 34.8–46.1)
HDLC SERPL-MCNC: 67 MG/DL
HGB BLD-MCNC: 13.9 G/DL (ref 11.5–15.4)
IMM GRANULOCYTES # BLD AUTO: 0.01 THOUSAND/UL (ref 0–0.2)
IMM GRANULOCYTES NFR BLD AUTO: 0 % (ref 0–2)
LDLC SERPL CALC-MCNC: 114 MG/DL (ref 0–100)
LYMPHOCYTES # BLD AUTO: 1.62 THOUSANDS/ΜL (ref 0.6–4.47)
LYMPHOCYTES NFR BLD AUTO: 26 % (ref 14–44)
MCH RBC QN AUTO: 29.6 PG (ref 26.8–34.3)
MCHC RBC AUTO-ENTMCNC: 31 G/DL (ref 31.4–37.4)
MCV RBC AUTO: 96 FL (ref 82–98)
MONOCYTES # BLD AUTO: 0.73 THOUSAND/ΜL (ref 0.17–1.22)
MONOCYTES NFR BLD AUTO: 12 % (ref 4–12)
NEUTROPHILS # BLD AUTO: 3.79 THOUSANDS/ΜL (ref 1.85–7.62)
NEUTS SEG NFR BLD AUTO: 59 % (ref 43–75)
NONHDLC SERPL-MCNC: 133 MG/DL
NRBC BLD AUTO-RTO: 0 /100 WBCS
PLATELET # BLD AUTO: 238 THOUSANDS/UL (ref 149–390)
PMV BLD AUTO: 10.2 FL (ref 8.9–12.7)
POTASSIUM SERPL-SCNC: 4.1 MMOL/L (ref 3.5–5.3)
PROT SERPL-MCNC: 6.8 G/DL (ref 6.4–8.2)
RBC # BLD AUTO: 4.7 MILLION/UL (ref 3.81–5.12)
SODIUM SERPL-SCNC: 143 MMOL/L (ref 136–145)
TRIGL SERPL-MCNC: 93 MG/DL
TSH SERPL DL<=0.05 MIU/L-ACNC: 1.32 UIU/ML (ref 0.36–3.74)
WBC # BLD AUTO: 6.36 THOUSAND/UL (ref 4.31–10.16)

## 2020-10-22 PROCEDURE — 84443 ASSAY THYROID STIM HORMONE: CPT

## 2020-10-22 PROCEDURE — 36415 COLL VENOUS BLD VENIPUNCTURE: CPT

## 2020-10-22 PROCEDURE — 85025 COMPLETE CBC W/AUTO DIFF WBC: CPT

## 2020-10-22 PROCEDURE — 80061 LIPID PANEL: CPT

## 2020-10-22 PROCEDURE — 80053 COMPREHEN METABOLIC PANEL: CPT

## 2020-10-22 PROCEDURE — 83036 HEMOGLOBIN GLYCOSYLATED A1C: CPT

## 2021-04-02 ENCOUNTER — TRANSCRIBE ORDERS (OUTPATIENT)
Dept: ADMINISTRATIVE | Facility: HOSPITAL | Age: 86
End: 2021-04-02

## 2021-04-02 ENCOUNTER — APPOINTMENT (OUTPATIENT)
Dept: LAB | Facility: CLINIC | Age: 86
End: 2021-04-02
Payer: MEDICARE

## 2021-04-02 DIAGNOSIS — F41.9 ANXIETY: ICD-10-CM

## 2021-04-02 DIAGNOSIS — E66.9 LIFELONG OBESITY: ICD-10-CM

## 2021-04-02 DIAGNOSIS — M81.0 SENILE OSTEOPOROSIS: ICD-10-CM

## 2021-04-02 DIAGNOSIS — I77.1 STRICTURE OF ARTERY (HCC): ICD-10-CM

## 2021-04-02 DIAGNOSIS — I10 ESSENTIAL HYPERTENSION, BENIGN: ICD-10-CM

## 2021-04-02 DIAGNOSIS — R60.0 LOCALIZED EDEMA: ICD-10-CM

## 2021-04-02 DIAGNOSIS — Z23 NEED FOR PROPHYLACTIC VACCINATION AND INOCULATION AGAINST CHOLERA ALONE: ICD-10-CM

## 2021-04-02 DIAGNOSIS — R14.3 FLATULENCE, ERUCTATION, AND GAS PAIN: ICD-10-CM

## 2021-04-02 DIAGNOSIS — N32.81 DETRUSOR INSTABILITY OF BLADDER: ICD-10-CM

## 2021-04-02 DIAGNOSIS — R14.1 FLATULENCE, ERUCTATION, AND GAS PAIN: ICD-10-CM

## 2021-04-02 DIAGNOSIS — I10 ESSENTIAL HYPERTENSION, BENIGN: Primary | ICD-10-CM

## 2021-04-02 DIAGNOSIS — R14.2 FLATULENCE, ERUCTATION, AND GAS PAIN: ICD-10-CM

## 2021-04-02 LAB
ANION GAP SERPL CALCULATED.3IONS-SCNC: 2 MMOL/L (ref 4–13)
BUN SERPL-MCNC: 8 MG/DL (ref 5–25)
CALCIUM SERPL-MCNC: 9.1 MG/DL (ref 8.3–10.1)
CHLORIDE SERPL-SCNC: 110 MMOL/L (ref 100–108)
CO2 SERPL-SCNC: 31 MMOL/L (ref 21–32)
CREAT SERPL-MCNC: 0.66 MG/DL (ref 0.6–1.3)
GFR SERPL CREATININE-BSD FRML MDRD: 81 ML/MIN/1.73SQ M
GLUCOSE P FAST SERPL-MCNC: 121 MG/DL (ref 65–99)
POTASSIUM SERPL-SCNC: 4.2 MMOL/L (ref 3.5–5.3)
SODIUM SERPL-SCNC: 143 MMOL/L (ref 136–145)

## 2021-04-02 PROCEDURE — 80048 BASIC METABOLIC PNL TOTAL CA: CPT

## 2021-04-02 PROCEDURE — 36415 COLL VENOUS BLD VENIPUNCTURE: CPT

## 2021-11-05 ENCOUNTER — APPOINTMENT (OUTPATIENT)
Dept: LAB | Facility: CLINIC | Age: 86
End: 2021-11-05
Payer: MEDICARE

## 2021-11-05 DIAGNOSIS — M81.0 SENILE OSTEOPOROSIS: ICD-10-CM

## 2021-11-05 DIAGNOSIS — I10 HYPERTENSION, ESSENTIAL: ICD-10-CM

## 2021-11-05 DIAGNOSIS — I77.1 TORTUOUS AORTA (HCC): ICD-10-CM

## 2021-11-05 DIAGNOSIS — R60.0 LOWER EXTREMITY EDEMA: ICD-10-CM

## 2021-11-05 DIAGNOSIS — R14.2 BELCHING: ICD-10-CM

## 2021-11-05 DIAGNOSIS — I35.1 MILD AORTIC REGURGITATION: ICD-10-CM

## 2021-11-05 DIAGNOSIS — F41.9 ANXIETY: ICD-10-CM

## 2021-11-05 DIAGNOSIS — Z23 NEED FOR PROPHYLACTIC VACCINATION AND INOCULATION AGAINST CHOLERA ALONE: ICD-10-CM

## 2021-11-05 DIAGNOSIS — N32.81 OVERACTIVE BLADDER: ICD-10-CM

## 2021-11-05 LAB
ALBUMIN SERPL BCP-MCNC: 3.5 G/DL (ref 3.5–5)
ALP SERPL-CCNC: 92 U/L (ref 46–116)
ALT SERPL W P-5'-P-CCNC: 21 U/L (ref 12–78)
ANION GAP SERPL CALCULATED.3IONS-SCNC: 3 MMOL/L (ref 4–13)
AST SERPL W P-5'-P-CCNC: 12 U/L (ref 5–45)
BASOPHILS # BLD AUTO: 0.05 THOUSANDS/ΜL (ref 0–0.1)
BASOPHILS NFR BLD AUTO: 1 % (ref 0–1)
BILIRUB SERPL-MCNC: 0.78 MG/DL (ref 0.2–1)
BUN SERPL-MCNC: 11 MG/DL (ref 5–25)
CALCIUM SERPL-MCNC: 9.3 MG/DL (ref 8.3–10.1)
CHLORIDE SERPL-SCNC: 105 MMOL/L (ref 100–108)
CHOLEST SERPL-MCNC: 181 MG/DL (ref 50–200)
CO2 SERPL-SCNC: 31 MMOL/L (ref 21–32)
CREAT SERPL-MCNC: 0.56 MG/DL (ref 0.6–1.3)
EOSINOPHIL # BLD AUTO: 0.06 THOUSAND/ΜL (ref 0–0.61)
EOSINOPHIL NFR BLD AUTO: 1 % (ref 0–6)
ERYTHROCYTE [DISTWIDTH] IN BLOOD BY AUTOMATED COUNT: 13 % (ref 11.6–15.1)
EST. AVERAGE GLUCOSE BLD GHB EST-MCNC: 120 MG/DL
GFR SERPL CREATININE-BSD FRML MDRD: 85 ML/MIN/1.73SQ M
GLUCOSE P FAST SERPL-MCNC: 103 MG/DL (ref 65–99)
HBA1C MFR BLD: 5.8 %
HCT VFR BLD AUTO: 46 % (ref 34.8–46.1)
HDLC SERPL-MCNC: 61 MG/DL
HGB BLD-MCNC: 14.6 G/DL (ref 11.5–15.4)
IMM GRANULOCYTES # BLD AUTO: 0.02 THOUSAND/UL (ref 0–0.2)
IMM GRANULOCYTES NFR BLD AUTO: 0 % (ref 0–2)
LDLC SERPL CALC-MCNC: 99 MG/DL (ref 0–100)
LYMPHOCYTES # BLD AUTO: 1.54 THOUSANDS/ΜL (ref 0.6–4.47)
LYMPHOCYTES NFR BLD AUTO: 19 % (ref 14–44)
MCH RBC QN AUTO: 29.9 PG (ref 26.8–34.3)
MCHC RBC AUTO-ENTMCNC: 31.7 G/DL (ref 31.4–37.4)
MCV RBC AUTO: 94 FL (ref 82–98)
MONOCYTES # BLD AUTO: 0.59 THOUSAND/ΜL (ref 0.17–1.22)
MONOCYTES NFR BLD AUTO: 7 % (ref 4–12)
NEUTROPHILS # BLD AUTO: 5.66 THOUSANDS/ΜL (ref 1.85–7.62)
NEUTS SEG NFR BLD AUTO: 72 % (ref 43–75)
NONHDLC SERPL-MCNC: 120 MG/DL
NRBC BLD AUTO-RTO: 0 /100 WBCS
PLATELET # BLD AUTO: 227 THOUSANDS/UL (ref 149–390)
PMV BLD AUTO: 10.3 FL (ref 8.9–12.7)
POTASSIUM SERPL-SCNC: 3.8 MMOL/L (ref 3.5–5.3)
PROT SERPL-MCNC: 6.9 G/DL (ref 6.4–8.2)
RBC # BLD AUTO: 4.88 MILLION/UL (ref 3.81–5.12)
SODIUM SERPL-SCNC: 139 MMOL/L (ref 136–145)
TRIGL SERPL-MCNC: 106 MG/DL
TSH SERPL DL<=0.05 MIU/L-ACNC: 1.86 UIU/ML (ref 0.36–3.74)
WBC # BLD AUTO: 7.92 THOUSAND/UL (ref 4.31–10.16)

## 2021-11-05 PROCEDURE — 85025 COMPLETE CBC W/AUTO DIFF WBC: CPT

## 2021-11-05 PROCEDURE — 83036 HEMOGLOBIN GLYCOSYLATED A1C: CPT

## 2021-11-05 PROCEDURE — 80053 COMPREHEN METABOLIC PANEL: CPT

## 2021-11-05 PROCEDURE — 84443 ASSAY THYROID STIM HORMONE: CPT

## 2021-11-05 PROCEDURE — 36415 COLL VENOUS BLD VENIPUNCTURE: CPT

## 2021-11-05 PROCEDURE — 80061 LIPID PANEL: CPT

## 2024-09-04 ENCOUNTER — HOSPITAL ENCOUNTER (EMERGENCY)
Facility: HOSPITAL | Age: 89
Discharge: HOME | End: 2024-09-04
Attending: EMERGENCY MEDICINE | Admitting: EMERGENCY MEDICINE
Payer: MEDICARE

## 2024-09-04 VITALS
OXYGEN SATURATION: 95 % | RESPIRATION RATE: 17 BRPM | SYSTOLIC BLOOD PRESSURE: 165 MMHG | HEART RATE: 89 BPM | DIASTOLIC BLOOD PRESSURE: 85 MMHG | TEMPERATURE: 99.2 F

## 2024-09-04 DIAGNOSIS — R53.1 GENERALIZED WEAKNESS: ICD-10-CM

## 2024-09-04 DIAGNOSIS — N39.0 UTI (URINARY TRACT INFECTION): Primary | ICD-10-CM

## 2024-09-04 LAB
ALBUMIN SERPL BCG-MCNC: 4 G/DL (ref 3.5–5)
ALP SERPL-CCNC: 80 U/L (ref 34–104)
ALT SERPL W P-5'-P-CCNC: 13 U/L (ref 7–52)
ANION GAP SERPL CALCULATED.3IONS-SCNC: 7 MMOL/L (ref 4–13)
AST SERPL W P-5'-P-CCNC: 14 U/L (ref 13–39)
BACTERIA UR QL AUTO: ABNORMAL /HPF
BASOPHILS # BLD AUTO: 0.03 THOUSANDS/ÂΜL (ref 0–0.1)
BASOPHILS NFR BLD AUTO: 1 % (ref 0–1)
BILIRUB SERPL-MCNC: 0.61 MG/DL (ref 0.2–1)
BILIRUB UR QL STRIP: NEGATIVE
BUN SERPL-MCNC: 11 MG/DL (ref 5–25)
CALCIUM SERPL-MCNC: 9.5 MG/DL (ref 8.4–10.2)
CHLORIDE SERPL-SCNC: 102 MMOL/L (ref 96–108)
CLARITY UR: CLEAR
CO2 SERPL-SCNC: 31 MMOL/L (ref 21–32)
COLOR UR: ABNORMAL
CREAT SERPL-MCNC: 0.56 MG/DL (ref 0.6–1.3)
EOSINOPHIL # BLD AUTO: 0.05 THOUSAND/ÂΜL (ref 0–0.61)
EOSINOPHIL NFR BLD AUTO: 1 % (ref 0–6)
ERYTHROCYTE [DISTWIDTH] IN BLOOD BY AUTOMATED COUNT: 12.4 % (ref 11.6–15.1)
FLUAV RNA RESP QL NAA+PROBE: NEGATIVE
FLUBV RNA RESP QL NAA+PROBE: NEGATIVE
GFR SERPL CREATININE-BSD FRML MDRD: 83 ML/MIN/1.73SQ M
GLUCOSE SERPL-MCNC: 133 MG/DL (ref 65–140)
GLUCOSE UR STRIP-MCNC: NEGATIVE MG/DL
HCT VFR BLD AUTO: 48.2 % (ref 34.8–46.1)
HGB BLD-MCNC: 15.3 G/DL (ref 11.5–15.4)
HGB UR QL STRIP.AUTO: NEGATIVE
IMM GRANULOCYTES # BLD AUTO: 0.02 THOUSAND/UL (ref 0–0.2)
IMM GRANULOCYTES NFR BLD AUTO: 0 % (ref 0–2)
KETONES UR STRIP-MCNC: NEGATIVE MG/DL
LEUKOCYTE ESTERASE UR QL STRIP: ABNORMAL
LYMPHOCYTES # BLD AUTO: 1.26 THOUSANDS/ÂΜL (ref 0.6–4.47)
LYMPHOCYTES NFR BLD AUTO: 22 % (ref 14–44)
MCH RBC QN AUTO: 30.2 PG (ref 26.8–34.3)
MCHC RBC AUTO-ENTMCNC: 31.7 G/DL (ref 31.4–37.4)
MCV RBC AUTO: 95 FL (ref 82–98)
MONOCYTES # BLD AUTO: 0.57 THOUSAND/ÂΜL (ref 0.17–1.22)
MONOCYTES NFR BLD AUTO: 10 % (ref 4–12)
NEUTROPHILS # BLD AUTO: 3.92 THOUSANDS/ÂΜL (ref 1.85–7.62)
NEUTS SEG NFR BLD AUTO: 66 % (ref 43–75)
NITRITE UR QL STRIP: POSITIVE
NON-SQ EPI CELLS URNS QL MICRO: ABNORMAL /HPF
NRBC BLD AUTO-RTO: 0 /100 WBCS
PH UR STRIP.AUTO: 7 [PH]
PLATELET # BLD AUTO: 215 THOUSANDS/UL (ref 149–390)
PMV BLD AUTO: 9.8 FL (ref 8.9–12.7)
POTASSIUM SERPL-SCNC: 4.1 MMOL/L (ref 3.5–5.3)
PROT SERPL-MCNC: 6.6 G/DL (ref 6.4–8.4)
PROT UR STRIP-MCNC: NEGATIVE MG/DL
RBC # BLD AUTO: 5.07 MILLION/UL (ref 3.81–5.12)
RBC #/AREA URNS AUTO: ABNORMAL /HPF
RSV RNA RESP QL NAA+PROBE: NEGATIVE
SARS-COV-2 RNA RESP QL NAA+PROBE: NEGATIVE
SODIUM SERPL-SCNC: 140 MMOL/L (ref 135–147)
SP GR UR STRIP.AUTO: <1.005 (ref 1–1.03)
TSH SERPL DL<=0.05 MIU/L-ACNC: 1.01 UIU/ML (ref 0.45–4.5)
UROBILINOGEN UR STRIP-ACNC: <2 MG/DL
WBC # BLD AUTO: 5.85 THOUSAND/UL (ref 4.31–10.16)
WBC #/AREA URNS AUTO: ABNORMAL /HPF

## 2024-09-04 PROCEDURE — 93005 ELECTROCARDIOGRAM TRACING: CPT

## 2024-09-04 PROCEDURE — 80053 COMPREHEN METABOLIC PANEL: CPT | Performed by: EMERGENCY MEDICINE

## 2024-09-04 PROCEDURE — 85025 COMPLETE CBC W/AUTO DIFF WBC: CPT | Performed by: EMERGENCY MEDICINE

## 2024-09-04 PROCEDURE — 0241U HB NFCT DS VIR RESP RNA 4 TRGT: CPT | Performed by: EMERGENCY MEDICINE

## 2024-09-04 PROCEDURE — 99285 EMERGENCY DEPT VISIT HI MDM: CPT

## 2024-09-04 PROCEDURE — 84443 ASSAY THYROID STIM HORMONE: CPT | Performed by: EMERGENCY MEDICINE

## 2024-09-04 PROCEDURE — 36415 COLL VENOUS BLD VENIPUNCTURE: CPT | Performed by: EMERGENCY MEDICINE

## 2024-09-04 PROCEDURE — 99284 EMERGENCY DEPT VISIT MOD MDM: CPT | Performed by: EMERGENCY MEDICINE

## 2024-09-04 PROCEDURE — 81001 URINALYSIS AUTO W/SCOPE: CPT | Performed by: EMERGENCY MEDICINE

## 2024-09-04 RX ORDER — CEPHALEXIN 500 MG/1
500 CAPSULE ORAL 4 TIMES DAILY
Qty: 28 CAPSULE | Refills: 0 | Status: SHIPPED | OUTPATIENT
Start: 2024-09-04 | End: 2024-09-11

## 2024-09-04 RX ADMIN — CEPHALEXIN 500 MG: 250 CAPSULE ORAL at 14:42

## 2024-09-04 NOTE — ED PROVIDER NOTES
History  Chief Complaint   Patient presents with    Extremity Weakness     BLLE WEAKNESS THAT IS CHRONIC. MEALS ON WHEELS CAME TO HER HOUSE CALLED EMS B/C PT FELT WEAK IN THE LEGS. normally GOES AWAY BUT HAS BEEN WEAK FOR 3 HRS, AMBULATORY AT [facility]ENE. Denies chest pain/SOB.      Patient is an 89-year-old female who presents for evaluation of generalized weakness.  She lives on her own and says that she was having some difficulty walking.  She has not left her house in 2 weeks because of her generalized weakness.  She also endorses some fatigue.  She denies any focal complaints.  She denies recent falls chest pain dyspnea nausea vomiting or diaphoresis.  Denies abdominal pain urinary or bowel symptoms.        Prior to Admission Medications   Prescriptions Last Dose Informant Patient Reported? Taking?   ALPHAGAN P 0.1 %  Self Yes No   Sig: PUT 1 DROP IN BOTH EYES TWICE A DAY   MYRBETRIQ 25 MG TB24  Self Yes No   Sig: Take 25 mg by mouth daily   Na Sulfate-K Sulfate-Mg Sulf (SUPREP BOWEL PREP KIT) 17.5-3.13-1.6 GM/177ML SOLN   No No   Sig: Use as directed   amLODIPine (NORVASC) 5 mg tablet  Self Yes No   Sig: Take 5 mg by mouth daily   amitriptyline (ELAVIL) 50 mg tablet  Self Yes No   Sig: Take 50 mg by mouth daily at bedtime.   dorzolamide (TRUSOPT) 2 % ophthalmic solution  Self Yes No   Sig: Administer 1 drop to both eyes 2 (two) times a day    lisinopril (ZESTRIL) 40 mg tablet  Self Yes No   Sig: Take 40 mg by mouth daily   meclizine (ANTIVERT) 12.5 MG tablet  Self No No   Sig: Take 1 tablet (12.5 mg total) by mouth 3 (three) times a day as needed for dizziness for up to 6 doses.   nebivolol (BYSTOLIC) 10 mg tablet  Self Yes No   Sig: Take 10 mg by mouth 2 (two) times a day.   pantoprazole (PROTONIX) 40 mg tablet   No No   Sig: Take 1 tablet (40 mg total) by mouth daily in the early morning for 14 days   Patient not taking: Reported on 11/7/2019   travoprost (TRAVATAN Z) 0.004 % ophthalmic solution  Self Yes No    Si drop daily at bedtime.      Facility-Administered Medications: None       Past Medical History:   Diagnosis Date    Cardiac disease     CHF (congestive heart failure) (HCC)     Colitis     Hypertension     Psychiatric disorder     depression       Past Surgical History:   Procedure Laterality Date    COLONOSCOPY  2010    VAGINAL DELIVERY      x2       History reviewed. No pertinent family history.  I have reviewed and agree with the history as documented.    E-Cigarette/Vaping     E-Cigarette/Vaping Substances     Social History     Tobacco Use    Smoking status: Never    Smokeless tobacco: Never   Substance Use Topics    Alcohol use: Never    Drug use: Never       Review of Systems   Constitutional:  Negative for fever.   HENT:  Negative for sore throat.    Respiratory:  Negative for shortness of breath.    Cardiovascular:  Negative for chest pain.   Gastrointestinal:  Negative for abdominal pain.   Genitourinary:  Negative for dysuria.   Musculoskeletal:  Negative for back pain.   Skin:  Negative for rash.   Neurological:  Positive for weakness. Negative for light-headedness.   Psychiatric/Behavioral:  Negative for agitation.    All other systems reviewed and are negative.      Physical Exam  Physical Exam  Vitals reviewed.   Constitutional:       General: She is not in acute distress.     Appearance: She is well-developed.   HENT:      Head: Normocephalic.   Eyes:      Pupils: Pupils are equal, round, and reactive to light.   Cardiovascular:      Rate and Rhythm: Normal rate and regular rhythm.      Heart sounds: Normal heart sounds.   Pulmonary:      Effort: Pulmonary effort is normal.      Breath sounds: Normal breath sounds.   Abdominal:      General: Bowel sounds are normal. There is no distension.      Palpations: Abdomen is soft.      Tenderness: There is no abdominal tenderness. There is no guarding.   Musculoskeletal:         General: No tenderness or deformity. Normal range of motion.       Cervical back: Normal range of motion and neck supple.   Skin:     General: Skin is warm and dry.      Capillary Refill: Capillary refill takes less than 2 seconds.   Neurological:      Mental Status: She is alert and oriented to person, place, and time.      Cranial Nerves: No cranial nerve deficit.      Sensory: No sensory deficit.   Psychiatric:         Behavior: Behavior normal.         Thought Content: Thought content normal.         Judgment: Judgment normal.         Vital Signs  ED Triage Vitals [09/04/24 1218]   Temperature Pulse Respirations Blood Pressure SpO2   99.2 °F (37.3 °C) 83 18 164/79 95 %      Temp Source Heart Rate Source Patient Position - Orthostatic VS BP Location FiO2 (%)   Oral Monitor -- -- --      Pain Score       --           Vitals:    09/04/24 1218 09/04/24 1300 09/04/24 1400   BP: 164/79 165/77 165/85   Pulse: 83 83 89         Visual Acuity      ED Medications  Medications   cephalexin (KEFLEX) capsule 500 mg (500 mg Oral Given 9/4/24 1442)       Diagnostic Studies  Results Reviewed       Procedure Component Value Units Date/Time    Urine Microscopic [[id number]]  (Abnormal) Collected: 09/04/24 1317    Lab Status: Final result Specimen: Urine, Clean Catch Updated: 09/04/24 1426     RBC, UA None Seen /hpf      WBC, UA 30-50 /hpf      Epithelial Cells Occasional /hpf      Bacteria, UA Innumerable /hpf     UA (URINE) with reflex to Scope [[id number]]  (Abnormal) Collected: 09/04/24 1317    Lab Status: Final result Specimen: Urine, Clean Catch Updated: 09/04/24 1342     Color, UA Light Yellow     Clarity, UA Clear     Specific Gravity, UA <1.005     pH, UA 7.0     Leukocytes, UA Large     Nitrite, UA Positive     Protein, UA Negative mg/dl      Glucose, UA Negative mg/dl      Ketones, UA Negative mg/dl      Urobilinogen, UA <2.0 mg/dl      Bilirubin, UA Negative     Occult Blood, UA Negative    FLU/RSV/COVID - if FLU/RSV clinically relevant [[id number]]  (Normal) Collected: 09/04/24 1228     Lab Status: Final result Specimen: Nares from Nose Updated: 09/04/24 1319     SARS-CoV-2 Negative     INFLUENZA A PCR Negative     INFLUENZA B PCR Negative     RSV PCR Negative    Narrative:      This test has been performed using the CoV-2/Flu/RSV plus assay on the [employer] platform. This test has been validated by the  and verified by the performing laboratory.     This test is designed to amplify and detect the following: nucleocapsid (N), envelope (E), and RNA-dependent RNA polymerase (RdRP) genes of the SARS-CoV-2 genome; matrix (M), basic polymerase (PB2), and acidic protein (PA) segments of the influenza A genome; matrix (M) and non-structural protein (NS) segments of the influenza B genome, and the nucleocapsid genes of RSV A and RSV B.     Positive results are indicative of the presence of Flu A, Flu B, RSV, and/or SARS-CoV-2 RNA. Positive results for SARS-CoV-2 or suspected novel influenza should be reported to state, local, or federal health departments according to local reporting requirements.      All results should be assessed in conjunction with clinical presentation and other laboratory markers for clinical management.     FOR PEDIATRIC PATIENTS - copy/paste COVID Guidelines URL to browser: [email or web address]       TSH, 3rd generation with Free T4 reflex [[id number]]  (Normal) Collected: 09/04/24 1228    Lab Status: Final result Specimen: Blood from Arm, Left Updated: 09/04/24 1312     TSH 3RD GENERATON 1.014 uIU/mL     Comprehensive metabolic panel [[id number]]  (Abnormal) Collected: 09/04/24 1228    Lab Status: Final result Specimen: Blood from Arm, Left Updated: 09/04/24 1301     Sodium 140 mmol/L      Potassium 4.1 mmol/L      Chloride 102 mmol/L      CO2 31 mmol/L      ANION GAP 7 mmol/L      BUN 11 mg/dL      Creatinine 0.56 mg/dL      Glucose 133 mg/dL      Calcium 9.5 mg/dL      AST 14 U/L      ALT 13 U/L       Alkaline Phosphatase 80 U/L      Total Protein 6.6 g/dL      Albumin 4.0 g/dL      Total Bilirubin 0.61 mg/dL      eGFR 83 ml/min/1.73sq m     Narrative:      National Kid[employer] guidelines for Chronic Kidney Disease (CKD):     Stage 1 with normal or high GFR (GFR > 90 mL/min/1.73 square meters)    Stage 2 Mild CKD (GFR = 60-89 mL/min/1.73 square meters)    Stage 3A Moderate CKD (GFR = 45-59 mL/min/1.73 square meters)    Stage 3B Moderate CKD (GFR = 30-44 mL/min/1.73 square meters)    Stage 4 Severe CKD (GFR = 15-29 mL/min/1.73 square meters)    Stage 5 End Stage CKD (GFR <15 mL/min/1.73 square meters)  Note: GFR calculation is accurate only with a steady state creatinine    CBC and differential [[id number]]  (Abnormal) Collected: 09/04/24 1228    Lab Status: Final result Specimen: Blood from Arm, Left Updated: 09/04/24 1242     WBC 5.85 Thousand/uL      RBC 5.07 Million/uL      Hemoglobin 15.3 g/dL      Hematocrit 48.2 %      MCV 95 fL      MCH 30.2 pg      MCHC 31.7 g/dL      RDW 12.4 %      MPV 9.8 fL      Platelets 215 Thousands/uL      nRBC 0 /100 WBCs      Segmented % 66 %      Immature Grans % 0 %      Lymphocytes % 22 %      Monocytes % 10 %      Eosinophils Relative 1 %      Basophils Relative 1 %      Absolute Neutrophils 3.92 Thousands/µL      Absolute Immature Grans 0.02 Thousand/uL      Absolute Lymphocytes 1.26 Thousands/µL      Absolute Monocytes 0.57 Thousand/µL      Eosinophils Absolute 0.05 Thousand/µL      Basophils Absolute 0.03 Thousands/µL                    No orders to display              Procedures  ECG 12 Lead Documentation Only    Date/Time: 9/4/2024 3:31 PM    Performed by: Savage Payton MD  Authorized by: Savage Payton MD    ECG reviewed by me, the ED Provider: yes    Patient location:  ED  Previous ECG:     Previous ECG:  Unavailable    Comparison to cardiac monitor: Yes    Interpretation:     Interpretation: normal    Rate:     ECG rate assessment: normal     Rhythm:     Rhythm: sinus rhythm    Ectopy:     Ectopy: none    QRS:     QRS axis:  Normal    QRS intervals:  Normal  Conduction:     Conduction: normal    ST segments:     ST segments:  Normal  T waves:     T waves: normal             ED Course                                               Medical Decision Making  Patient is an 89-year-old female who presents for evaluation of generalized weakness.  Blood work reviewed and unremarkable, EKG negative.  Urinalysis reveals UTI.  Patient was walked with a walker here and was pulled off to go home.  Daughter agrees with the treatment plan at this time.  Advised outpatient follow-up and strict return precautions.    Amount and/or Complexity of Data Reviewed  Labs: ordered.    Risk  Prescription drug management.                 Disposition  Final diagnoses:   UTI (urinary tract infection)   Generalized weakness     Time reflects when diagnosis was documented in both MDM as applicable and the Disposition within this note       Time User Action Codes Description Comment    9/4/2024  2:19 PM Savage Payton Add [N39.0] UTI (urinary tract infection)     9/4/2024  2:19 PM Savage Payton Add [R53.1] Generalized weakness           ED Disposition       ED Disposition   Discharge    Condition   Stable    Date/Time   Wed Sep 4, 2024  2:19 PM    Comment   Joy White discharge to home/self care.                   Follow-up Information       Follow up With Specialties Details Why Contact Info Additional Information     [facility] Emergency Department Emergency Medicine  If symptoms worsen [address] [facility] [id number] [phone] [facility] Emergency Department, [address], [place] [address]            Discharge Medication List as of 9/4/2024  2:29 PM        START taking these medications    Details   cephalexin (KEFLEX) 500 mg capsule Take 1 capsule (500 mg total) by mouth 4  (four) times a day for 7 days, Starting Wed 9/4/2024, Until Wed 9/11/2024, Normal           CONTINUE these medications which have NOT CHANGED    Details   ALPHAGAN P 0.1 % PUT 1 DROP IN BOTH EYES TWICE A DAY, Historical Med      amitriptyline (ELAVIL) 50 mg tablet Take 50 mg by mouth daily at bedtime., Historical Med      amLODIPine (NORVASC) 5 mg tablet Take 5 mg by mouth daily, Historical Med      dorzolamide (TRUSOPT) 2 % ophthalmic solution Administer 1 drop to both eyes 2 (two) times a day , Historical Med      lisinopril (ZESTRIL) 40 mg tablet Take 40 mg by mouth daily, Starting Wed 7/24/2019, Historical Med      meclizine (ANTIVERT) 12.5 MG tablet Take 1 tablet (12.5 mg total) by mouth 3 (three) times a day as needed for dizziness for up to 6 doses., Starting Sat 6/25/2016, Normal      MYRBETRIQ 25 MG TB24 Take 25 mg by mouth daily, Starting Wed 7/24/2019, Historical Med      Na Sulfate-K Sulfate-Mg Sulf (SUPREP BOWEL PREP KIT) 17.5-3.13-1.6 GM/177ML SOLN Use as directed, Normal      nebivolol (BYSTOLIC) 10 mg tablet Take 10 mg by mouth 2 (two) times a day., Historical Med      pantoprazole (PROTONIX) 40 mg tablet Take 1 tablet (40 mg total) by mouth daily in the early morning for 14 days, Starting Mon 9/2/2019, Until Mon 9/16/2019, Normal      travoprost (TRAVATAN Z) 0.004 % ophthalmic solution 1 drop daily at bedtime., Historical Med             No discharge procedures on file.    PDMP Review         Value Time User    PDMP Reviewed  Yes 9/1/2019  1:09 PM Adolfo Lennon MD            ED Provider  Electronically Signed by             Savage Payton MD  09/04/24 2651

## 2024-09-06 LAB
ATRIAL RATE: 82 BPM
P AXIS: 59 DEGREES
PR INTERVAL: 134 MS
QRS AXIS: 5 DEGREES
QRSD INTERVAL: 92 MS
QT INTERVAL: 372 MS
QTC INTERVAL: 434 MS
T WAVE AXIS: 78 DEGREES
VENTRICULAR RATE: 82 BPM

## 2024-09-06 PROCEDURE — 93010 ELECTROCARDIOGRAM REPORT: CPT | Performed by: INTERNAL MEDICINE

## 2024-09-19 ENCOUNTER — HOSPITAL ENCOUNTER (INPATIENT)
Facility: HOSPITAL | Age: 89
LOS: 3 days | Discharge: NURSING, REHAB OR LONG-TERM CARE | DRG: 552 | End: 2024-09-22
Attending: EMERGENCY MEDICINE | Admitting: INTERNAL MEDICINE
Payer: MEDICARE

## 2024-09-19 ENCOUNTER — APPOINTMENT (EMERGENCY)
Dept: CT IMAGING | Facility: HOSPITAL | Age: 89
DRG: 552 | End: 2024-09-19
Payer: MEDICARE

## 2024-09-19 DIAGNOSIS — E87.6 HYPOKALEMIA: ICD-10-CM

## 2024-09-19 DIAGNOSIS — E83.42 HYPOMAGNESEMIA: ICD-10-CM

## 2024-09-19 DIAGNOSIS — S22.000A THORACIC COMPRESSION FRACTURE (HCC): ICD-10-CM

## 2024-09-19 DIAGNOSIS — R06.09 EXERTIONAL DYSPNEA: ICD-10-CM

## 2024-09-19 DIAGNOSIS — R53.1 GENERALIZED WEAKNESS: Primary | ICD-10-CM

## 2024-09-19 PROBLEM — S22.060A COMPRESSION FRACTURE OF T7 VERTEBRA (HCC): Status: ACTIVE | Noted: 2024-09-19

## 2024-09-19 PROBLEM — R09.02 HYPOXIA: Status: ACTIVE | Noted: 2024-09-19

## 2024-09-19 LAB
2HR DELTA HS TROPONIN: -1 NG/L
ALBUMIN SERPL BCG-MCNC: 3.7 G/DL (ref 3.5–5)
ALP SERPL-CCNC: 75 U/L (ref 34–104)
ALT SERPL W P-5'-P-CCNC: 11 U/L (ref 7–52)
ANION GAP SERPL CALCULATED.3IONS-SCNC: 7 MMOL/L (ref 4–13)
AST SERPL W P-5'-P-CCNC: 12 U/L (ref 13–39)
BASOPHILS # BLD AUTO: 0.03 THOUSANDS/ΜL (ref 0–0.1)
BASOPHILS NFR BLD AUTO: 1 % (ref 0–1)
BILIRUB SERPL-MCNC: 0.52 MG/DL (ref 0.2–1)
BILIRUB UR QL STRIP: NEGATIVE
BNP SERPL-MCNC: 104 PG/ML (ref 0–100)
BUN SERPL-MCNC: 8 MG/DL (ref 5–25)
CALCIUM SERPL-MCNC: 8.9 MG/DL (ref 8.4–10.2)
CARDIAC TROPONIN I PNL SERPL HS: 5 NG/L
CARDIAC TROPONIN I PNL SERPL HS: 6 NG/L
CHLORIDE SERPL-SCNC: 102 MMOL/L (ref 96–108)
CLARITY UR: CLEAR
CO2 SERPL-SCNC: 30 MMOL/L (ref 21–32)
COLOR UR: NORMAL
CREAT SERPL-MCNC: 0.52 MG/DL (ref 0.6–1.3)
D DIMER PPP FEU-MCNC: 1.51 UG/ML FEU
EOSINOPHIL # BLD AUTO: 0.05 THOUSAND/ΜL (ref 0–0.61)
EOSINOPHIL NFR BLD AUTO: 1 % (ref 0–6)
ERYTHROCYTE [DISTWIDTH] IN BLOOD BY AUTOMATED COUNT: 12.1 % (ref 11.6–15.1)
FLUAV AG UPPER RESP QL IA.RAPID: NEGATIVE
FLUBV AG UPPER RESP QL IA.RAPID: NEGATIVE
GFR SERPL CREATININE-BSD FRML MDRD: 85 ML/MIN/1.73SQ M
GLUCOSE SERPL-MCNC: 134 MG/DL (ref 65–140)
GLUCOSE UR STRIP-MCNC: NEGATIVE MG/DL
HCT VFR BLD AUTO: 44.9 % (ref 34.8–46.1)
HGB BLD-MCNC: 14.4 G/DL (ref 11.5–15.4)
HGB UR QL STRIP.AUTO: NEGATIVE
IMM GRANULOCYTES # BLD AUTO: 0.04 THOUSAND/UL (ref 0–0.2)
IMM GRANULOCYTES NFR BLD AUTO: 1 % (ref 0–2)
KETONES UR STRIP-MCNC: NEGATIVE MG/DL
LEUKOCYTE ESTERASE UR QL STRIP: NEGATIVE
LYMPHOCYTES # BLD AUTO: 0.93 THOUSANDS/ΜL (ref 0.6–4.47)
LYMPHOCYTES NFR BLD AUTO: 14 % (ref 14–44)
MAGNESIUM SERPL-MCNC: 1.8 MG/DL (ref 1.9–2.7)
MCH RBC QN AUTO: 30.5 PG (ref 26.8–34.3)
MCHC RBC AUTO-ENTMCNC: 32.1 G/DL (ref 31.4–37.4)
MCV RBC AUTO: 95 FL (ref 82–98)
MONOCYTES # BLD AUTO: 0.64 THOUSAND/ΜL (ref 0.17–1.22)
MONOCYTES NFR BLD AUTO: 10 % (ref 4–12)
NEUTROPHILS # BLD AUTO: 4.77 THOUSANDS/ΜL (ref 1.85–7.62)
NEUTS SEG NFR BLD AUTO: 73 % (ref 43–75)
NITRITE UR QL STRIP: NEGATIVE
NRBC BLD AUTO-RTO: 0 /100 WBCS
PH UR STRIP.AUTO: 7 [PH]
PLATELET # BLD AUTO: 222 THOUSANDS/UL (ref 149–390)
PMV BLD AUTO: 10.4 FL (ref 8.9–12.7)
POTASSIUM SERPL-SCNC: 3.3 MMOL/L (ref 3.5–5.3)
PROT SERPL-MCNC: 6.4 G/DL (ref 6.4–8.4)
PROT UR STRIP-MCNC: NEGATIVE MG/DL
RBC # BLD AUTO: 4.72 MILLION/UL (ref 3.81–5.12)
SARS-COV+SARS-COV-2 AG RESP QL IA.RAPID: NEGATIVE
SODIUM SERPL-SCNC: 139 MMOL/L (ref 135–147)
SP GR UR STRIP.AUTO: 1.01 (ref 1–1.03)
TSH SERPL DL<=0.05 MIU/L-ACNC: 0.87 UIU/ML (ref 0.45–4.5)
UROBILINOGEN UR STRIP-ACNC: <2 MG/DL
WBC # BLD AUTO: 6.46 THOUSAND/UL (ref 4.31–10.16)

## 2024-09-19 PROCEDURE — 99223 1ST HOSP IP/OBS HIGH 75: CPT | Performed by: INTERNAL MEDICINE

## 2024-09-19 PROCEDURE — 74177 CT ABD & PELVIS W/CONTRAST: CPT

## 2024-09-19 PROCEDURE — 71275 CT ANGIOGRAPHY CHEST: CPT

## 2024-09-19 PROCEDURE — 83880 ASSAY OF NATRIURETIC PEPTIDE: CPT | Performed by: EMERGENCY MEDICINE

## 2024-09-19 PROCEDURE — 87804 INFLUENZA ASSAY W/OPTIC: CPT | Performed by: EMERGENCY MEDICINE

## 2024-09-19 PROCEDURE — 36415 COLL VENOUS BLD VENIPUNCTURE: CPT | Performed by: EMERGENCY MEDICINE

## 2024-09-19 PROCEDURE — 99285 EMERGENCY DEPT VISIT HI MDM: CPT | Performed by: EMERGENCY MEDICINE

## 2024-09-19 PROCEDURE — 96366 THER/PROPH/DIAG IV INF ADDON: CPT

## 2024-09-19 PROCEDURE — 96365 THER/PROPH/DIAG IV INF INIT: CPT

## 2024-09-19 PROCEDURE — 85025 COMPLETE CBC W/AUTO DIFF WBC: CPT | Performed by: EMERGENCY MEDICINE

## 2024-09-19 PROCEDURE — 85379 FIBRIN DEGRADATION QUANT: CPT | Performed by: EMERGENCY MEDICINE

## 2024-09-19 PROCEDURE — 83735 ASSAY OF MAGNESIUM: CPT | Performed by: EMERGENCY MEDICINE

## 2024-09-19 PROCEDURE — 99285 EMERGENCY DEPT VISIT HI MDM: CPT

## 2024-09-19 PROCEDURE — 96361 HYDRATE IV INFUSION ADD-ON: CPT

## 2024-09-19 PROCEDURE — 81003 URINALYSIS AUTO W/O SCOPE: CPT | Performed by: EMERGENCY MEDICINE

## 2024-09-19 PROCEDURE — 93005 ELECTROCARDIOGRAM TRACING: CPT

## 2024-09-19 PROCEDURE — 87811 SARS-COV-2 COVID19 W/OPTIC: CPT | Performed by: EMERGENCY MEDICINE

## 2024-09-19 PROCEDURE — 84443 ASSAY THYROID STIM HORMONE: CPT | Performed by: EMERGENCY MEDICINE

## 2024-09-19 PROCEDURE — 80053 COMPREHEN METABOLIC PANEL: CPT | Performed by: EMERGENCY MEDICINE

## 2024-09-19 PROCEDURE — 84484 ASSAY OF TROPONIN QUANT: CPT | Performed by: EMERGENCY MEDICINE

## 2024-09-19 RX ORDER — NEBIVOLOL 5 MG/1
10 TABLET ORAL 2 TIMES DAILY
Status: DISCONTINUED | OUTPATIENT
Start: 2024-09-19 | End: 2024-09-22 | Stop reason: HOSPADM

## 2024-09-19 RX ORDER — FUROSEMIDE 20 MG
10 TABLET ORAL DAILY PRN
COMMUNITY

## 2024-09-19 RX ORDER — CALCIUM CARBONATE 500 MG/1
500 TABLET, CHEWABLE ORAL 2 TIMES DAILY PRN
Status: DISCONTINUED | OUTPATIENT
Start: 2024-09-19 | End: 2024-09-22 | Stop reason: HOSPADM

## 2024-09-19 RX ORDER — DORZOLAMIDE HCL 20 MG/ML
1 SOLUTION/ DROPS OPHTHALMIC 2 TIMES DAILY
Status: DISCONTINUED | OUTPATIENT
Start: 2024-09-19 | End: 2024-09-22 | Stop reason: HOSPADM

## 2024-09-19 RX ORDER — ACETAMINOPHEN 325 MG/1
650 TABLET ORAL EVERY 6 HOURS PRN
Status: DISCONTINUED | OUTPATIENT
Start: 2024-09-19 | End: 2024-09-22 | Stop reason: HOSPADM

## 2024-09-19 RX ORDER — AMITRIPTYLINE HYDROCHLORIDE 50 MG/1
50 TABLET ORAL
Status: DISCONTINUED | OUTPATIENT
Start: 2024-09-19 | End: 2024-09-22 | Stop reason: HOSPADM

## 2024-09-19 RX ORDER — POLYETHYLENE GLYCOL 3350 17 G/17G
17 POWDER, FOR SOLUTION ORAL DAILY PRN
Status: DISCONTINUED | OUTPATIENT
Start: 2024-09-19 | End: 2024-09-21

## 2024-09-19 RX ORDER — LANOLIN ALCOHOL/MO/W.PET/CERES
6 CREAM (GRAM) TOPICAL
Status: DISCONTINUED | OUTPATIENT
Start: 2024-09-19 | End: 2024-09-22 | Stop reason: HOSPADM

## 2024-09-19 RX ORDER — ONDANSETRON 2 MG/ML
4 INJECTION INTRAMUSCULAR; INTRAVENOUS EVERY 4 HOURS PRN
Status: DISCONTINUED | OUTPATIENT
Start: 2024-09-19 | End: 2024-09-22 | Stop reason: HOSPADM

## 2024-09-19 RX ORDER — LATANOPROST 50 UG/ML
1 SOLUTION/ DROPS OPHTHALMIC
COMMUNITY

## 2024-09-19 RX ORDER — LIDOCAINE 50 MG/G
1 PATCH TOPICAL ONCE
Status: COMPLETED | OUTPATIENT
Start: 2024-09-19 | End: 2024-09-20

## 2024-09-19 RX ORDER — OXYBUTYNIN CHLORIDE 5 MG/1
5 TABLET, EXTENDED RELEASE ORAL DAILY
Status: DISCONTINUED | OUTPATIENT
Start: 2024-09-20 | End: 2024-09-22 | Stop reason: HOSPADM

## 2024-09-19 RX ORDER — MECLIZINE HCL 12.5 MG 12.5 MG/1
12.5 TABLET ORAL 3 TIMES DAILY PRN
Status: DISCONTINUED | OUTPATIENT
Start: 2024-09-19 | End: 2024-09-22 | Stop reason: HOSPADM

## 2024-09-19 RX ORDER — AMLODIPINE BESYLATE 5 MG/1
5 TABLET ORAL DAILY
Status: DISCONTINUED | OUTPATIENT
Start: 2024-09-20 | End: 2024-09-22 | Stop reason: HOSPADM

## 2024-09-19 RX ORDER — MAGNESIUM SULFATE HEPTAHYDRATE 40 MG/ML
2 INJECTION, SOLUTION INTRAVENOUS ONCE
Status: COMPLETED | OUTPATIENT
Start: 2024-09-19 | End: 2024-09-19

## 2024-09-19 RX ORDER — ENOXAPARIN SODIUM 100 MG/ML
40 INJECTION SUBCUTANEOUS DAILY
Status: DISCONTINUED | OUTPATIENT
Start: 2024-09-20 | End: 2024-09-22 | Stop reason: HOSPADM

## 2024-09-19 RX ORDER — LIDOCAINE 50 MG/G
1 PATCH TOPICAL DAILY
Status: DISCONTINUED | OUTPATIENT
Start: 2024-09-20 | End: 2024-09-22 | Stop reason: HOSPADM

## 2024-09-19 RX ORDER — LISINOPRIL 20 MG/1
40 TABLET ORAL DAILY
Status: DISCONTINUED | OUTPATIENT
Start: 2024-09-20 | End: 2024-09-22 | Stop reason: HOSPADM

## 2024-09-19 RX ORDER — DOCUSATE SODIUM 100 MG/1
100 CAPSULE, LIQUID FILLED ORAL 2 TIMES DAILY PRN
Status: DISCONTINUED | OUTPATIENT
Start: 2024-09-19 | End: 2024-09-21

## 2024-09-19 RX ORDER — POTASSIUM CHLORIDE 20MEQ/15ML
40 LIQUID (ML) ORAL ONCE
Status: COMPLETED | OUTPATIENT
Start: 2024-09-19 | End: 2024-09-19

## 2024-09-19 RX ORDER — LATANOPROST 50 UG/ML
1 SOLUTION/ DROPS OPHTHALMIC
Status: DISCONTINUED | OUTPATIENT
Start: 2024-09-19 | End: 2024-09-22 | Stop reason: HOSPADM

## 2024-09-19 RX ADMIN — AMITRIPTYLINE HYDROCHLORIDE 50 MG: 50 TABLET, FILM COATED ORAL at 21:13

## 2024-09-19 RX ADMIN — IOHEXOL 100 ML: 350 INJECTION, SOLUTION INTRAVENOUS at 14:48

## 2024-09-19 RX ADMIN — POTASSIUM CHLORIDE 40 MEQ: 1.5 SOLUTION ORAL at 13:47

## 2024-09-19 RX ADMIN — MAGNESIUM SULFATE HEPTAHYDRATE 2 G: 2 INJECTION, SOLUTION INTRAVENOUS at 13:47

## 2024-09-19 RX ADMIN — LATANOPROST 1 DROP: 50 SOLUTION OPHTHALMIC at 21:12

## 2024-09-19 RX ADMIN — LIDOCAINE 1 PATCH: 700 PATCH TOPICAL at 12:50

## 2024-09-19 RX ADMIN — DORZOLAMIDE HCL 1 DROP: 20 SOLUTION/ DROPS OPHTHALMIC at 21:12

## 2024-09-19 RX ADMIN — SODIUM CHLORIDE 500 ML: 0.9 INJECTION, SOLUTION INTRAVENOUS at 12:55

## 2024-09-19 NOTE — ED PROVIDER NOTES
1. Generalized weakness    2. Hypokalemia    3. Hypomagnesemia    4. Thoracic compression fracture (HCC)    5. Exertional dyspnea      ED Disposition       ED Disposition   Admit    Condition   Stable    Date/Time   u Sep 19, 2024  3:58 PM    Comment   Case was discussed with JESENIA and the patient's admission status was agreed to be Admission Status: inpatient status to the service of Dr. Cheng .               Assessment & Plan       Medical Decision Making  89 year old female presents for evaluation of generalized weakness.  No objective weakness on exam.  Thoracic tenderness present.  Patient admitted to fall 3-4 days ago.  No anticoagulation or antiplatelets.  Chronic nonpitting edema of the lower extremities.  Low risk Well's score for PE.  D-dimer elevated.  CT PE study negative for PE.  Patient informed of incidental findings of gallstones and fat containing hernia.  Spinal recon films consistent with T7 compression fracture.  Suspect this is acute from fall over the weekend.  Patient will need TLSO brace when out of bed and PT/OT eval.  Patient was noted in the ED to desaturate into the 80s with ambulation.  Unknown etiology at this time.  Minimal elevation in BNP.  Patient admitted for further evaluation and management.    Amount and/or Complexity of Data Reviewed  Labs: ordered. Decision-making details documented in ED Course.  Radiology: ordered.    Risk  Prescription drug management.  Decision regarding hospitalization.                ED Course as of 09/19/24 1600   Thu Sep 19, 2024   1331 D-Dimer, Quant(!): 1.51   1343 Patient became very short of breath and oxygen saturation dropped into the 80s when the nurse stood the patient for orthostatics.  Blood pressure remained stable.       Medications   lidocaine (LIDODERM) 5 % patch 1 patch (1 patch Topical Medication Applied 9/19/24 1250)   sodium chloride 0.9 % bolus 500 mL (0 mL Intravenous Stopped 9/19/24 1355)   potassium chloride oral solution 40 mEq  (40 mEq Oral Given 9/19/24 1347)   magnesium sulfate 2 g/50 mL IVPB (premix) 2 g (2 g Intravenous New Bag 9/19/24 1347)   iohexol (OMNIPAQUE) 350 MG/ML injection (MULTI-DOSE) 100 mL (100 mL Intravenous Given 9/19/24 1448)       History of Present Illness       89 year old female presents for evaluation of generalized weakness.  Patient states she was sitting down to eat breakfast when [employer] Whe[employer] arrived to her home.  She tried to get up to answer the door, but her legs felt very shaky.  Patient did not fall and denies any associated pain.  She states that she has urinary frequency that does not improve despite completing two courses of antibiotics.  She reports chronic lower extremity edema, but feels the right is worse than the left.  No fevers or chills.  She denies headache, dizziness or lightheadedness.  Patient states her appetite has been normal and she denies nausea, vomiting or diarrhea.  Patient later admitted that she had fallen onto her back 3-4 days ago and she has been having back pain since.            Review of Systems        Objective     ED Triage Vitals   Temperature Pulse Blood Pressure Respirations SpO2 Patient Position - Orthostatic VS   09/19/24 1152 09/19/24 1151 09/19/24 1151 09/19/24 1151 09/19/24 1152 09/19/24 1152   98.6 °F (37 °C) 78 (!) 259/79 (!) 30 95 % Lying      Temp Source Heart Rate Source BP Location FiO2 (%) Pain Score    09/19/24 1152 09/19/24 1230 09/19/24 1245 -- 09/19/24 1152    Oral Monitor Right arm  No Pain        Physical Exam  Vitals and nursing note reviewed.   HENT:      Head: Normocephalic and atraumatic.   Eyes:      Pupils: Pupils are equal, round, and reactive to light.   Cardiovascular:      Rate and Rhythm: Normal rate and regular rhythm.      Pulses: Normal pulses.      Heart sounds: Normal heart sounds.   Pulmonary:      Effort: Pulmonary effort is normal. No respiratory distress.      Breath sounds: Normal breath sounds.   Abdominal:      General: There  is no distension.      Palpations: Abdomen is soft.      Tenderness: There is no abdominal tenderness.   Musculoskeletal:         General: No deformity.      Right lower leg: Edema (2+ nonpitting) present.      Left lower leg: Edema (1+ nonpitting) present.      Comments: T spine tenderness.  No midline C or L spine tenderness. No step offs or deformities.   5/5 strength dorsiflexion, plantarflexion, knee flexion and knee extension bilaterally.         Skin:     General: Skin is warm and dry.   Neurological:      Mental Status: She is alert and oriented to person, place, and time.         Labs Reviewed   COMPREHENSIVE METABOLIC PANEL - Abnormal       Result Value    Sodium 139      Potassium 3.3 (*)     Chloride 102      CO2 30      ANION GAP 7      BUN 8      Creatinine 0.52 (*)     Glucose 134      Calcium 8.9      AST 12 (*)     ALT 11      Alkaline Phosphatase 75      Total Protein 6.4      Albumin 3.7      Total Bilirubin 0.52      eGFR 85      Narrative:     National Kid[employer] guidelines for Chronic Kidney Disease (CKD):     Stage 1 with normal or high GFR (GFR > 90 mL/min/1.73 square meters)    Stage 2 Mild CKD (GFR = 60-89 mL/min/1.73 square meters)    Stage 3A Moderate CKD (GFR = 45-59 mL/min/1.73 square meters)    Stage 3B Moderate CKD (GFR = 30-44 mL/min/1.73 square meters)    Stage 4 Severe CKD (GFR = 15-29 mL/min/1.73 square meters)    Stage 5 End Stage CKD (GFR <15 mL/min/1.73 square meters)  Note: GFR calculation is accurate only with a steady state creatinine   MAGNESIUM - Abnormal    Magnesium 1.8 (*)    D-DIMER, QUANTITATIVE - Abnormal    D-Dimer, Quant 1.51 (*)     Narrative:     In the evaluation for possible pulmonary embolism, in the appropriate (Well's Score of 4 or less) patient, the age adjusted d-dimer cutoff for this patient can be calculated as:    Age x 0.01 (in ug/mL) for Age-adjusted D-dimer exclusion threshold for a patient over 50 years.   B-TYPE NATRIURETIC PEPTIDE  (BNP) - Abnormal     (*)    COVID-19/INFLUENZA A/B RAPID ANTIGEN (30 MIN.TAT) - Normal    SARS COV Rapid Antigen Negative      Influenza A Rapid Antigen Negative      Influenza B Rapid Antigen Negative      Narrative:     This test has been performed using the [employer] [facility] 2 FLU+SARS Antigen test under the Emergency Use Authorization (EUA). This test has been validated by the  and verified by the performing laboratory. The Anais uses lateral flow immunofluorescent sandwich assay to detect SARS-COV, Influenza A and Influenza B Antigen.     The [employer] An[facility] 2 SARS Antigen test does not differentiate between SARS-CoV and SARS-CoV-2.     Negative results are presumptive and may be confirmed with a molecular assay, if necessary, for patient management. Negative results do not rule out SARS-CoV-2 or influenza infection and should not be used as the sole basis for treatment or patient management decisions. A negative test result may occur if the level of antigen in a sample is below the limit of detection of this test.     Positive results are indicative of the presence of viral antigens, but do not rule out bacterial infection or co-infection with other viruses.     All test results should be used as an adjunct to clinical observations and other information available to the provider.    FOR PEDIATRIC PATIENTS - copy/paste COVID Guidelines URL to browser: [email or web address]   TSH, 3RD GENERATION WITH FREE T4 REFLEX - Normal    TSH 3RD GENERATON 0.869     HS TROPONIN I 0HR - Normal    hs TnI 0hr 6     HS TROPONIN I 2HR - Normal    hs TnI 2hr 5      Delta 2hr hsTnI -1     CBC AND DIFFERENTIAL    WBC 6.46      RBC 4.72      Hemoglobin 14.4      Hematocrit 44.9      MCV 95      MCH 30.5      MCHC 32.1      RDW 12.1      MPV 10.4      Platelets 222      nRBC 0      Segmented % 73      Immature Grans % 1      Lymphocytes % 14      Monocytes % 10       Eosinophils Relative 1      Basophils Relative 1      Absolute Neutrophils 4.77      Absolute Immature Grans 0.04      Absolute Lymphocytes 0.93      Absolute Monocytes 0.64      Eosinophils Absolute 0.05      Basophils Absolute 0.03     UA W REFLEX TO MICROSCOPIC WITH REFLEX TO CULTURE    Color, UA Light Yellow      Clarity, UA Clear      Specific Gravity, UA 1.010      pH, UA 7.0      Leukocytes, UA Negative      Nitrite, UA Negative      Protein, UA Negative      Glucose, UA Negative      Ketones, UA Negative      Urobilinogen, UA <2.0      Bilirubin, UA Negative      Occult Blood, UA Negative       CT recon only thoracolumbar   Final Interpretation by Jamie Vergara MD (09/19 1545)      Age-indeterminate mild loss of height of the T7 vertebral body. Suggestion of mild prevertebral edema in this region should be correlated with focal point tenderness for the possibility of an acute fracture and if warranted MRI can be performed for    further evaluation.               Workstation performed: [id number]         CTA chest (pe study) abdomen pelvis contrast   Final Interpretation by Jamie Vergara MD (09/19 1545)      No evidence for pulmonary embolism. No focal airspace consolidation.      Cholelithiasis without evidence for acute cholecystitis      Fat-containing right ventral abdominal wall hernias.      Correlate with separate CT of the thoracolumbar spine for additional findings.         Workstation performed: [id number]             ECG 12 Lead Documentation Only    Date/Time: 9/19/2024 12:46 PM    Performed by: Jessy Ramos MD  Authorized by: Jessy Ramos MD    Indications / Diagnosis:  Generalized weakness  Patient location:  ED  Previous ECG:     Previous ECG:  Compared to current    Comparison ECG info:  9/4/24 normal ekg    Similarity:  Changes noted  Interpretation:     Interpretation: abnormal    Rate:     ECG rate:  74    ECG rate assessment: normal    Rhythm:     Rhythm: sinus rhythm     Ectopy:     Ectopy: PVCs    QRS:     QRS axis:  Normal    QRS intervals:  Normal  Conduction:     Conduction: normal    ST segments:     ST segments:  Normal  T waves:     T waves: normal        ED Medication and Procedure Management   Prior to Admission Medications   Prescriptions Last Dose Informant Patient Reported? Taking?   ALPHAGAN P 0.1 %  Self Yes Yes   Sig: PUT 1 DROP IN BOTH EYES TWICE A DAY   MYRBETRIQ 25 MG TB24  Self Yes No   Sig: Take 25 mg by mouth daily   Na Sulfate-K Sulfate-Mg Sulf (SUPREP BOWEL PREP KIT) 17.5-3.13-1.6 GM/177ML SOLN   No No   Sig: Use as directed   amLODIPine (NORVASC) 5 mg tablet  Self Yes Yes   Sig: Take 5 mg by mouth daily   amitriptyline (ELAVIL) 50 mg tablet  Self Yes Yes   Sig: Take 50 mg by mouth daily at bedtime.   dorzolamide (TRUSOPT) 2 % ophthalmic solution  Self Yes Yes   Sig: Administer 1 drop to both eyes 2 (two) times a day    furosemide (LASIX) 20 mg tablet   Yes Yes   Sig: Take 10 mg by mouth daily as needed (as needed for leg swelling)   latanoprost (XALATAN) 0.005 % ophthalmic solution   Yes Yes   Sig: Administer 1 drop to both eyes daily at bedtime   lisinopril (ZESTRIL) 40 mg tablet  Self Yes No   Sig: Take 40 mg by mouth daily   meclizine (ANTIVERT) 12.5 MG tablet  Self No No   Sig: Take 1 tablet (12.5 mg total) by mouth 3 (three) times a day as needed for dizziness for up to 6 doses.   nebivolol (BYSTOLIC) 10 mg tablet  Self Yes No   Sig: Take 10 mg by mouth 2 (two) times a day.   pantoprazole (PROTONIX) 40 mg tablet   No No   Sig: Take 1 tablet (40 mg total) by mouth daily in the early morning for 14 days   Patient not taking: Reported on 2019   travoprost (TRAVATAN Z) 0.004 % ophthalmic solution  Self Yes No   Si drop daily at bedtime.      Facility-Administered Medications: None     Patient's Medications   Discharge Prescriptions    No medications on file     No discharge procedures on file.     Jessy Ramos MD  24 1600

## 2024-09-19 NOTE — ASSESSMENT & PLAN NOTE
She has a generalized weakness and falls over the past at least 4 weeks at home.  She lives home alone but does have visiting Meals on Wheels and her daughter nearby.  Her daughter has wanted her to be evaluated and possibly go to rehab however she has been declining up until today.  Suspect her generalized weakness and recurrent falls are due to deconditioning.  She has nonfocal neurologic exam, no evidence of infection.  PT and OT evaluations

## 2024-09-19 NOTE — ASSESSMENT & PLAN NOTE
Her blood pressure was elevated at 163/94  Resume home regimen of amlodipine, lisinopril, Bystolic  Monitor blood pressure

## 2024-09-19 NOTE — ASSESSMENT & PLAN NOTE
She was hypoxic with ambulation in the ER  CTA chest without PE, pneumonia, effusion or volume overload  No clear etiology identified at this time, check oxygen saturation with ambulation tomorrow  Monitor for now overnight

## 2024-09-19 NOTE — PLAN OF CARE
Problem: Potential for Falls  Goal: Patient will remain free of falls  Description: INTERVENTIONS:  - Educate patient/family on patient safety including physical limitations  - Instruct patient to call for assistance with activity   - Consult OT/PT to assist with strengthening/mobility   - Keep Call bell within reach  - Keep bed low and locked with side rails adjusted as appropriate  - Keep care items and personal belongings within reach  - Initiate and maintain comfort rounds  - Make Fall Risk Sign visible to staff  - Offer Toileting every 2 Hours, in advance of need  - Initiate/Maintain bed alarm  - Obtain necessary fall risk management equipment: socks  - Apply yellow socks and bracelet for high fall risk patients  - Consider moving patient to room near nurses station  Outcome: Progressing     Problem: PAIN - ADULT  Goal: Verbalizes/displays adequate comfort level or baseline comfort level  Description: Interventions:  - Encourage patient to monitor pain and request assistance  - Assess pain using appropriate pain scale  - Administer analgesics based on type and severity of pain and evaluate response  - Implement non-pharmacological measures as appropriate and evaluate response  - Consider cultural and social influences on pain and pain management  - Notify physician/advanced practitioner if interventions unsuccessful or patient reports new pain  Outcome: Progressing     Problem: INFECTION - ADULT  Goal: Absence or prevention of progression during hospitalization  Description: INTERVENTIONS:  - Assess and monitor for signs and symptoms of infection  - Monitor lab/diagnostic results  - Monitor all insertion sites, i.e. indwelling lines, tubes, and drains  - Administer medications as ordered  - Instruct and encourage patient and family to use good hand hygiene technique  - Identify and instruct in appropriate isolation precautions for identified infection/condition  Outcome: Progressing     Problem: SAFETY  ADULT  Goal: Patient will remain free of falls  Description: INTERVENTIONS:  - Educate patient/family on patient safety including physical limitations  - Instruct patient to call for assistance with activity   - Consult OT/PT to assist with strengthening/mobility   - Keep Call bell within reach  - Keep bed low and locked with side rails adjusted as appropriate  - Keep care items and personal belongings within reach  - Initiate and maintain comfort rounds  - Make Fall Risk Sign visible to staff  - Offer Toileting every 2 Hours, in advance of need  - Initiate/Maintain bed alarm  - Obtain necessary fall risk management equipment: socks  - Apply yellow socks and bracelet for high fall risk patients  - Consider moving patient to room near nurses station  Outcome: Progressing  Goal: Maintain or return to baseline ADL function  Description: INTERVENTIONS:  -  Assess patient's ability to carry out ADLs; assess patient's baseline for ADL function and identify physical deficits which impact ability to perform ADLs (bathing, care of mouth/teeth, toileting, grooming, dressing, etc.)  - Assess/evaluate cause of self-care deficits   - Assess range of motion  - Assess patient's mobility; develop plan if impaired  - Assess patient's need for assistive devices and provide as appropriate  - Encourage maximum independence but intervene and supervise when necessary  - Involve family in performance of ADLs  - Assess for home care needs following discharge   - Consider OT consult to assist with ADL evaluation and planning for discharge  - Provide patient education as appropriate  Outcome: Progressing  Goal: Maintains/Returns to pre admission functional level  Description: INTERVENTIONS:  - Perform AM-PAC 6 Click Basic Mobility/ Daily Activity assessment daily.  - Set and communicate daily mobility goal to care team and patient/family/caregiver.   - Collaborate with rehabilitation services on mobility goals if consulted  - Perform Range  of Motion 3 times a day.  - Reposition patient every 2 hours.  - Dangle patient 3 times a day  - Stand patient 3 times a day  - Ambulate patient 3 times a day  - Out of bed to chair 3 times a day   - Out of bed for meals 3 times a day  - Out of bed for toileting  - Record patient progress and toleration of activity level   Outcome: Progressing     Problem: DISCHARGE PLANNING  Goal: Discharge to home or other facility with appropriate resources  Description: INTERVENTIONS:  - Identify barriers to discharge w/patient and caregiver  - Arrange for needed discharge resources and transportation as appropriate  - Identify discharge learning needs (meds, wound care, etc.)  - Arrange for interpretive services to assist at discharge as needed  - Refer to Case Management Department for coordinating discharge planning if the patient needs post-hospital services based on physician/advanced practitioner order or complex needs related to functional status, cognitive ability, or social support system  Outcome: Progressing

## 2024-09-19 NOTE — H&P
H&P - Hospitalist   Name: Joy White 89 y.o. female I MRN: [id number]  Unit/Bed#: [facility] I Date of Admission: 9/19/2024   Date of Service: 9/19/2024 I Hospital Day: 0     Assessment & Plan  Generalized weakness  She has a generalized weakness and falls over the past at least 4 weeks at home.  She lives home alone but does have visiting Meals on Wheels and her daughter nearby.  Her daughter has wanted her to be evaluated and possibly go to rehab however she has been declining up until today.  Suspect her generalized weakness and recurrent falls are due to deconditioning.  She has nonfocal neurologic exam, no evidence of infection.  PT and OT evaluations  Hypoxia  She was hypoxic with ambulation in the ER  CTA chest without PE, pneumonia, effusion or volume overload  No clear etiology identified at this time, check oxygen saturation with ambulation tomorrow  Monitor for now overnight  Compression fracture of T7 vertebra (HCC)  Noted mild loss of height of T7 vertebrae, compression fracture  TLSO bracing tomorrow  PT and OT  She does not have any pain presently but will order Tylenol as needed  Hypertension  Her blood pressure was elevated at 163/94  Resume home regimen of amlodipine, lisinopril, Bystolic  Monitor blood pressure  Depression  Mood is stable presently  Anemia, unspecified  She is not anemic, hemoglobin is 14  No active bleeding, repeat tomorrow  VTE Pharmacologic Prophylaxis: VTE Score: 3 Moderate Risk (Score 3-4) - Pharmacological DVT Prophylaxis Ordered: enoxaparin (Lovenox).  Code Status: full code     Anticipated Length of Stay: Patient will be admitted on an inpatient basis with an anticipated length of stay of greater than 2 midnights secondary to generalized weakness and compression fx.    Total Time for Visit, including Counseling / Coordination of Care: 75 Minutes. Greater than 50% of this total time spent on direct patient counseling and coordination of care.    Chief Complaint: weakness,  falls, back pain    History of Present Illness:  Joy White is a 89 y.o. female presenting with generalized weakness over the past month, frequent falls and back pain.  She had a major fall on Sunday, falling onto her back.  Her legs been giving out on her and she has been feeling weak over the past month.  Was recently on antibiotics for UTI but has not improved.  She lives at home alone, does not ambulate too much but does have some help.  Suspect all related to deconditioning.  Found to have a compression fracture noted on thoracic spine imaging, does not complain of pain presently.    Review of Systems:  Review of Systems   Constitutional:  Negative for activity change, appetite change, chills, fatigue and fever.   HENT:  Negative for congestion, rhinorrhea, sinus pressure and sore throat.    Eyes:  Negative for photophobia, pain and visual disturbance.   Respiratory:  Negative for cough, shortness of breath and wheezing.    Cardiovascular:  Negative for chest pain, palpitations and leg swelling.   Gastrointestinal:  Negative for abdominal distention, abdominal pain, constipation, diarrhea, nausea and vomiting.   Endocrine: Negative for cold intolerance, heat intolerance, polydipsia and polyuria.   Genitourinary:  Negative for difficulty urinating, dysuria, flank pain, frequency and hematuria.   Musculoskeletal:  Positive for back pain. Negative for arthralgias and joint swelling.   Skin:  Negative for color change, pallor and rash.   Allergic/Immunologic: Negative.    Neurological:  Positive for weakness. Negative for dizziness, syncope, light-headedness and headaches.   Hematological: Negative.    Psychiatric/Behavioral: Negative.         Past Medical and Surgical History:   Past Medical History:   Diagnosis Date    Cardiac disease     CHF (congestive heart failure) (HCC)     Colitis     Hypertension     Psychiatric disorder     depression       Past Surgical History:   Procedure Laterality Date     COLONOSCOPY  09/27/2010    VAGINAL DELIVERY      x2       Meds/Allergies:  Prior to Admission medications    Medication Sig Start Date End Date Taking? Authorizing Provider   ALPHAGAN P 0.1 % PUT 1 DROP IN BOTH EYES TWICE A DAY 7/12/19  Yes Historical Provider, MD   amitriptyline (ELAVIL) 50 mg tablet Take 50 mg by mouth daily at bedtime.   Yes Historical Provider, MD   amLODIPine (NORVASC) 5 mg tablet Take 5 mg by mouth daily   Yes Historical Provider, MD   dorzolamide (TRUSOPT) 2 % ophthalmic solution Administer 1 drop to both eyes 2 (two) times a day    Yes Historical Provider, MD   furosemide (LASIX) 20 mg tablet Take 10 mg by mouth daily as needed (as needed for leg swelling)   Yes Historical Provider, MD   latanoprost (XALATAN) 0.005 % ophthalmic solution Administer 1 drop to both eyes daily at bedtime   Yes Historical Provider, MD   lisinopril (ZESTRIL) 40 mg tablet Take 40 mg by mouth daily 7/24/19   Historical Provider, MD   meclizine (ANTIVERT) 12.5 MG tablet Take 1 tablet (12.5 mg total) by mouth 3 (three) times a day as needed for dizziness for up to 6 doses. 6/25/16   Ela Gilliam MD   MYRBETRIQ 25 MG TB24 Take 25 mg by mouth daily 7/24/19   Historical Provider, MD   Na Sulfate-K Sulfate-Mg Sulf (SUPREP BOWEL PREP KIT) 17.5-3.13-1.6 GM/177ML SOLN Use as directed 11/1/19   JIGAR Alva   nebivolol (BYSTOLIC) 10 mg tablet Take 10 mg by mouth 2 (two) times a day.    Historical Provider, MD   pantoprazole (PROTONIX) 40 mg tablet Take 1 tablet (40 mg total) by mouth daily in the early morning for 14 days  Patient not taking: Reported on 11/7/2019 9/2/19 9/16/19  Adolfo Lennon MD   travoprost (TRAVATAN Z) 0.004 % ophthalmic solution 1 drop daily at bedtime.    Historical Provider, MD       All medications reviewed.    Allergies: No Known Allergies    Social History:  Marital Status:      Substance Use History:   Social History     Substance and Sexual Activity   Alcohol Use Never  "    Social History     Tobacco Use   Smoking Status Never   Smokeless Tobacco Never     Social History     Substance and Sexual Activity   Drug Use Never       Family History:  Non-contributory    Physical Exam:     Vitals:   Blood Pressure: 163/94 (09/19/24 1643)  Pulse: (!) 47 (09/19/24 1643)  Temperature: (!) 97.4 °F (36.3 °C) (09/19/24 1643)  Temp Source: Oral (09/19/24 1152)  Respirations: 19 (09/19/24 1400)  Height: 4' 10\" (147.3 cm) (09/19/24 1641)  Weight - Scale: 64.6 kg (142 lb 6.4 oz) (09/19/24 1500)  SpO2: 90 % (09/19/24 1643)    Physical Exam  Vitals and nursing note reviewed.   Constitutional:       General: She is not in acute distress.     Appearance: Normal appearance. She is not ill-appearing.   HENT:      Head: Normocephalic and atraumatic.      Mouth/Throat:      Mouth: Mucous membranes are moist.   Eyes:      General: No scleral icterus.        Right eye: No discharge.         Left eye: No discharge.      Pupils: Pupils are equal, round, and reactive to light.   Cardiovascular:      Rate and Rhythm: Normal rate and regular rhythm.      Heart sounds: No murmur heard.     No friction rub. No gallop.   Pulmonary:      Effort: No respiratory distress.      Breath sounds: No wheezing, rhonchi or rales.   Abdominal:      General: Bowel sounds are normal. There is no distension.      Palpations: Abdomen is soft.      Tenderness: There is no abdominal tenderness. There is no guarding or rebound.   Musculoskeletal:         General: No swelling or deformity.      Cervical back: Neck supple.      Right lower leg: No edema.      Left lower leg: No edema.   Skin:     General: Skin is warm and dry.      Capillary Refill: Capillary refill takes less than 2 seconds.      Coloration: Skin is not jaundiced or pale.      Findings: No erythema or rash.   Neurological:      General: No focal deficit present.      Mental Status: She is alert and oriented to person, place, and time. Mental status is at baseline.      " Cranial Nerves: No cranial nerve deficit.      Sensory: No sensory deficit.   Psychiatric:         Mood and Affect: Mood normal.         Behavior: Behavior normal.          Additional Data:     Lab Results:  Results from last 7 days   Lab Units 09/19/24  1244   WBC Thousand/uL 6.46   HEMOGLOBIN g/dL 14.4   HEMATOCRIT % 44.9   PLATELETS Thousands/uL 222   SEGS PCT % 73   LYMPHO PCT % 14   MONO PCT % 10   EOS PCT % 1     Results from last 7 days   Lab Units 09/19/24  1244   SODIUM mmol/L 139   POTASSIUM mmol/L 3.3*   CHLORIDE mmol/L 102   CO2 mmol/L 30   BUN mg/dL 8   CREATININE mg/dL 0.52*   ANION GAP mmol/L 7   CALCIUM mg/dL 8.9   ALBUMIN g/dL 3.7   TOTAL BILIRUBIN mg/dL 0.52   ALK PHOS U/L 75   ALT U/L 11   AST U/L 12*   GLUCOSE RANDOM mg/dL 134                       Imaging: All pertinent imaging reviewed.  CT recon only thoracolumbar   Final Result by Jamie Vergara MD (09/19 1545)      Age-indeterminate mild loss of height of the T7 vertebral body. Suggestion of mild prevertebral edema in this region should be correlated with focal point tenderness for the possibility of an acute fracture and if warranted MRI can be performed for    further evaluation.               Workstation performed: [id number]         CTA chest (pe study) abdomen pelvis contrast   Final Result by Jamie Vergara MD (09/19 1545)      No evidence for pulmonary embolism. No focal airspace consolidation.      Cholelithiasis without evidence for acute cholecystitis      Fat-containing right ventral abdominal wall hernias.      Correlate with separate CT of the thoracolumbar spine for additional findings.         Workstation performed: [id number]             EKG and Other Studies Reviewed on Admission:   Prior pertinent studies and records reviewed in [facility] Everywhere.    ** Please Note: This note has been constructed using a voice recognition system. **

## 2024-09-19 NOTE — ASSESSMENT & PLAN NOTE
Noted mild loss of height of T7 vertebrae, compression fracture  TLSO bracing tomorrow  PT and OT  She does not have any pain presently but will order Tylenol as needed

## 2024-09-20 LAB
ANION GAP SERPL CALCULATED.3IONS-SCNC: 6 MMOL/L (ref 4–13)
ATRIAL RATE: 74 BPM
BASOPHILS # BLD AUTO: 0.04 THOUSANDS/ΜL (ref 0–0.1)
BASOPHILS NFR BLD AUTO: 1 % (ref 0–1)
BUN SERPL-MCNC: 8 MG/DL (ref 5–25)
CALCIUM SERPL-MCNC: 8.5 MG/DL (ref 8.4–10.2)
CHLORIDE SERPL-SCNC: 106 MMOL/L (ref 96–108)
CO2 SERPL-SCNC: 28 MMOL/L (ref 21–32)
CREAT SERPL-MCNC: 0.42 MG/DL (ref 0.6–1.3)
EOSINOPHIL # BLD AUTO: 0.1 THOUSAND/ΜL (ref 0–0.61)
EOSINOPHIL NFR BLD AUTO: 1 % (ref 0–6)
ERYTHROCYTE [DISTWIDTH] IN BLOOD BY AUTOMATED COUNT: 12.5 % (ref 11.6–15.1)
GFR SERPL CREATININE-BSD FRML MDRD: 91 ML/MIN/1.73SQ M
GLUCOSE SERPL-MCNC: 112 MG/DL (ref 65–140)
HCT VFR BLD AUTO: 45.1 % (ref 34.8–46.1)
HGB BLD-MCNC: 13.9 G/DL (ref 11.5–15.4)
IMM GRANULOCYTES # BLD AUTO: 0.03 THOUSAND/UL (ref 0–0.2)
IMM GRANULOCYTES NFR BLD AUTO: 0 % (ref 0–2)
LYMPHOCYTES # BLD AUTO: 1.71 THOUSANDS/ΜL (ref 0.6–4.47)
LYMPHOCYTES NFR BLD AUTO: 24 % (ref 14–44)
MCH RBC QN AUTO: 29.6 PG (ref 26.8–34.3)
MCHC RBC AUTO-ENTMCNC: 30.8 G/DL (ref 31.4–37.4)
MCV RBC AUTO: 96 FL (ref 82–98)
MONOCYTES # BLD AUTO: 0.9 THOUSAND/ΜL (ref 0.17–1.22)
MONOCYTES NFR BLD AUTO: 13 % (ref 4–12)
NEUTROPHILS # BLD AUTO: 4.37 THOUSANDS/ΜL (ref 1.85–7.62)
NEUTS SEG NFR BLD AUTO: 61 % (ref 43–75)
NRBC BLD AUTO-RTO: 0 /100 WBCS
P AXIS: 48 DEGREES
PLATELET # BLD AUTO: 204 THOUSANDS/UL (ref 149–390)
PMV BLD AUTO: 10.2 FL (ref 8.9–12.7)
POTASSIUM SERPL-SCNC: 3.7 MMOL/L (ref 3.5–5.3)
PR INTERVAL: 150 MS
QRS AXIS: 3 DEGREES
QRSD INTERVAL: 94 MS
QT INTERVAL: 400 MS
QTC INTERVAL: 444 MS
RBC # BLD AUTO: 4.7 MILLION/UL (ref 3.81–5.12)
SODIUM SERPL-SCNC: 140 MMOL/L (ref 135–147)
T WAVE AXIS: 62 DEGREES
VENTRICULAR RATE: 74 BPM
WBC # BLD AUTO: 7.15 THOUSAND/UL (ref 4.31–10.16)

## 2024-09-20 PROCEDURE — 93010 ELECTROCARDIOGRAM REPORT: CPT | Performed by: INTERNAL MEDICINE

## 2024-09-20 PROCEDURE — 97760 ORTHOTIC MGMT&TRAING 1ST ENC: CPT

## 2024-09-20 PROCEDURE — 97163 PT EVAL HIGH COMPLEX 45 MIN: CPT

## 2024-09-20 PROCEDURE — 99232 SBSQ HOSP IP/OBS MODERATE 35: CPT

## 2024-09-20 PROCEDURE — 97129 THER IVNTJ 1ST 15 MIN: CPT

## 2024-09-20 PROCEDURE — 80048 BASIC METABOLIC PNL TOTAL CA: CPT | Performed by: PHYSICIAN ASSISTANT

## 2024-09-20 PROCEDURE — 85025 COMPLETE CBC W/AUTO DIFF WBC: CPT | Performed by: PHYSICIAN ASSISTANT

## 2024-09-20 PROCEDURE — 97167 OT EVAL HIGH COMPLEX 60 MIN: CPT

## 2024-09-20 RX ADMIN — DORZOLAMIDE HCL 1 DROP: 20 SOLUTION/ DROPS OPHTHALMIC at 09:04

## 2024-09-20 RX ADMIN — DORZOLAMIDE HCL 1 DROP: 20 SOLUTION/ DROPS OPHTHALMIC at 18:48

## 2024-09-20 RX ADMIN — NEBIVOLOL 10 MG: 5 TABLET ORAL at 09:02

## 2024-09-20 RX ADMIN — ENOXAPARIN SODIUM 40 MG: 40 INJECTION SUBCUTANEOUS at 09:02

## 2024-09-20 RX ADMIN — ACETAMINOPHEN 650 MG: 325 TABLET ORAL at 06:52

## 2024-09-20 RX ADMIN — LATANOPROST 1 DROP: 50 SOLUTION OPHTHALMIC at 21:05

## 2024-09-20 RX ADMIN — OXYBUTYNIN CHLORIDE 5 MG: 5 TABLET, EXTENDED RELEASE ORAL at 09:04

## 2024-09-20 RX ADMIN — AMITRIPTYLINE HYDROCHLORIDE 50 MG: 50 TABLET, FILM COATED ORAL at 21:05

## 2024-09-20 RX ADMIN — AMLODIPINE BESYLATE 5 MG: 5 TABLET ORAL at 09:01

## 2024-09-20 RX ADMIN — LISINOPRIL 40 MG: 20 TABLET ORAL at 09:02

## 2024-09-20 RX ADMIN — NEBIVOLOL 10 MG: 5 TABLET ORAL at 17:29

## 2024-09-20 NOTE — PLAN OF CARE
Problem: Potential for Falls  Goal: Patient will remain free of falls  Description: INTERVENTIONS:  - Educate patient/family on patient safety including physical limitations  - Instruct patient to call for assistance with activity   - Consult OT/PT to assist with strengthening/mobility   - Kee2p Call bell within reach  - Keep bed low and locked with side rails adjusted as appropriate  - Keep care items and personal belongings within reach  - Initiate and maintain comfort rounds  - Make Fall Risk Sign visible to staff  - Offer Toileting every 2 Hours, in advance of need  - Initiate/Maintain bed alarm  - Obtain necessary fall risk management equipment: socks  - Apply yellow socks and bracelet for high fall risk patients  - Consider moving patient to room near nurses station  Outcome: Progressing     Problem: PAIN - ADULT  Goal: Verbalizes/displays adequate comfort level or baseline comfort level  Description: Interventions:  - Encourage patient to monitor pain and request assistance  - Assess pain using appropriate pain scale  - Administer analgesics based on type and severity of pain and evaluate response  - Implement non-pharmacological measures as appropriate and evaluate response  - Consider cultural and social influences on pain and pain management  - Notify physician/advanced practitioner if interventions unsuccessful or patient reports new pain  Outcome: Progressing

## 2024-09-20 NOTE — ASSESSMENT & PLAN NOTE
She has a generalized weakness and falls over the past at least 4 weeks at home.  She lives home alone but does have visiting Meals on Wheels and her daughter nearby.  Her daughter has wanted her to be evaluated and possibly go to rehab  Suspect her generalized weakness and recurrent falls are due to deconditioning.  She has nonfocal neurologic exam, no evidence of infection.  PT and OT evaluations pending

## 2024-09-20 NOTE — CASE MANAGEMENT
Case Management Assessment & Discharge Planning Note    Patient name Joy White  Location /-01 MRN [id number]  : 1935 Date 2024       Current Admission Date: 2024  Current Admission Diagnosis:Generalized weakness   Patient Active Problem List    Diagnosis Date Noted Date Diagnosed    Compression fracture of T7 vertebra (HCC) 2024     Hypoxia 2024     Generalized weakness 2024     Anemia, unspecified 2019     Colitis 2019     Rectal bleeding 2019     Hypertension 2016     Depression 2016       LOS (days): 1  Geometric Mean LOS (GMLOS) (days): 2.9  Days to GMLOS:1.8     OBJECTIVE:    Risk of Unplanned Readmission Score: 13.05         Current admission status: Inpatient       Preferred Pharmacy:   [facility]/pharmacy #[id number] - [address] [address]  Phone: [phone] Fax: [phone]    Primary Care Provider: Radha Caberra DO    Primary Insurance: MEDICARE  Secondary Insurance: [facility]    ASSESSMENT:  Active Health Care Proxies       Claire Cortes Health Care Representative - Daughter   Primary Phone: [phone] (Home)  Mobile Phone: [phone]                 Advance Directives  Does patient have a Health Care POA?: No  Was patient offered paperwork?: Yes  Does patient currently have a Health Care decision maker?: Yes, please see Health Care Proxy section  Does patient have Advance Directives?: No  Was patient offered paperwork?: No  Primary Contact: Claire Cortes, trina.         Readmission Root Cause  30 Day Readmission: No    Patient Information  Admitted from:: Home  Mental Status: Confused  During Assessment patient was accompanied by: Not accompanied during assessment  Assessment information provided by:: Patient, Daughter  Primary Caregiver: Self  Support Systems: Self, Children  County of Residence: [place]  What city do you live in?: [place].  Home  entry access options. Select all that apply.: Stairs  Number of steps to enter home.: 7  Do the steps have railings?: Yes  Type of Current Residence: [facility]  Living Arrangements: Lives Alone  Is patient a ?: No    Activities of Daily Living Prior to Admission  Functional Status: Assistance  Completes ADLs independently?: Yes  Ambulates independently?: No  Level of ambulatory dependence: Assistance  Does patient use assisted devices?: Yes  Assisted Devices (DME) used: Straight Cane, Walker  Does patient currently own DME?: Yes  What DME does the patient currently own?: Walker, Straight Cane  Does patient have a history of Outpatient Therapy (PT/OT)?: No  Does the patient have a history of Short-Term Rehab?: No  Does patient have a history of HHC?: No  Does patient currently have HHC?: No         Patient Information Continued  Income Source: SSI/SSD  Does patient have prescription coverage?: Yes  Does patient receive dialysis treatments?: No  Does patient have a history of substance abuse?: No  Does patient have a history of Mental Health Diagnosis?: No         Means of Transportation  Means of Transport to [facility]:: Family transport      Social Determinants of Health (SDOH)      Flowsheet Row Most Recent Value   Housing Stability    In the last 12 months, was there a time when you were not able to pay the mortgage or rent on time? N   In the past 12 months, how many times have you moved where you were living? 0   At any time in the past 12 months, were you homeless or living in a shelter (including now)? N   Transportation Needs    In the past 12 months, has lack of transportation kept you from medical appointments or from getting medications? no   In the past 12 months, has lack of transportation kept you from meetings, work, or from getting things needed for daily living? No   Food Insecurity    Within the past 12 months, you worried that your food would run out before you got the money to buy more. Never true    Within the past 12 months, the food you bought just didn't last and you didn't have money to get more. Never true   Utilities    In the past 12 months has the electric, gas, oil, or water company threatened to shut off services in your home? No            DISCHARGE DETAILS:    Discharge planning discussed with:: Pt and Claire, her daughter.  Freedom of Choice: Yes  Comments - Freedom of Choice: Pt would like to go to [facility].  CM contacted family/caregiver?: Yes  Were Treatment Team discharge recommendations reviewed with patient/caregiver?: Yes  Did patient/caregiver verbalize understanding of patient care needs?: Yes  Were patient/caregiver advised of the risks associated with not following Treatment Team discharge recommendations?: Yes    Contacts  Patient Contacts: Claire Cortes.  Relationship to Patient:: Family  Contact Method: Phone  Phone Number: [phone]  Reason/Outcome: Discharge Planning, Referral, Emergency Contact    Requested Home Health Care         Is the patient interested in HHC at discharge?: No    DME Referral Provided  Referral made for DME?: No    Other Referral/Resources/Interventions Provided:  Interventions: Short Term Rehab, Transportation  Referral Comments: Cm review with pt and daughter PTOT's recommendation for STR. Both pt and daughter are agreeable. Choice is [facility]. CM reserved [facility] in [place]. CM rscheduled [facility] transport via [facility] for pt to go to [facility] 09/22 @ 1pm. Cm informed JESENIA, RN, pt, pt's daughter Claire, and [facility]. Medical necessity in paper chart.         Treatment Team Recommendation: Short Term Rehab  Discharge Destination Plan:: Short Term Rehab  Transport at Discharge : S Ambulance  Dispatcher Contacted: No  Number/Name of Dispatcher: [phone]  Transported by (Company and Unit #): [facility]  ETA of Transport (Date): 09/22/24  ETA of Transport (Time): 1300  Transport Service  Arrived: No                    Additional Comments: Cm met with pt to discern discharge needs. Cm also called daughter. Pt lives alone in a 1sh, 6-7STE, 1st fl setup. Pt reports that she had difficulty walking PTA. Reports completing ADLs independently. Retired and family provides transport. Uses and owns a walker and a cane. No hx of STR, HHC, or OPPT. [facility] reserved in [place]. Report no SDOH, mental health, or D&A concerns. Does not have a medical POA. Cm provided information on how to obtain one. S transport via [facility]/ Roundtrip has been scheduled for 09/22 at 1pm.

## 2024-09-20 NOTE — PROGRESS NOTES
Patient:    MRN:  [id number]    [place] Request ID:  [id number]    Level of care reserved:  Skilled Nursing Facility    [place] [id number] [phone]    Clinical needs requested:    Geography searched:  15 miles around [id number]    Start of Service:    Request sent:  2:04pm EDT on 9/20/2024 by Denilson Adam    Partner reserved:  2:30pm EDT on 9/20/2024 by Denilson Adam    Choice list shared:

## 2024-09-20 NOTE — PHYSICAL THERAPY NOTE
"                                                                                  PHYSICAL THERAPY EVALUATION NOTE      Patient Name: Joy White  Today's Date: 2024    AGE:   89 y.o.  Mrn:   [id number]  ADMIT DX:  Hypokalemia [E87.6]  Hypomagnesemia [E83.42]  Exertional dyspnea [R06.09]  Thoracic compression fracture (HCC) [S22.000A]  Generalized weakness [R53.1]    Past Medical History:   Diagnosis Date    Cardiac disease     CHF (congestive heart failure) (HCC)     Colitis     Hypertension     Psychiatric disorder     depression     Length Of Stay: 1  PHYSICAL THERAPY EVALUATION :   Patient's identity confirmed via 2 patient identifiers (full name and ) at start of session       24 1119   PT Last Visit   PT Visit Date 24   Note Type   Note type Evaluation;Orthotic Management/Training   Type of Brace   Brace Applied [employer] [place] TLSO   Patient Position When Brace Applied Seated  (at EOB. readjusted when standing to adjust for change in ABD.)   Bracing Recommendations Recommendation (comment)  (to be worn when OOB and ambulating. do not wear in bed.)   Education   Education Provided Yes  (however pt demonstrating (+) cognitive deficits and will need further education and instruction in brace management, donning/ doffing, and spinal precautions.)   End of Consult   Patient Position at End of Consult Bedside chair;All needs within reach;Bed/Chair alarm activated   Nurse Communication Nurse aware of consult, application of brace   Pain Assessment   Pain Assessment Tool 0-10   Pain Score No Pain  (at rest. pain onset with movement)   Blanchard-Baker FACES Pain Rating 6   Pain Location/Orientation   (both legs \"they hurt all the time\".)   Pain Onset/Description   (with movement)   Restrictions/Precautions   Weight Bearing Precautions Per Order No   Braces or Orthoses TLSO   Other Precautions Cognitive;Chair Alarm;Bed Alarm;Fall Risk;Pain;Spinal precautions   Home Living   Type of Home House   Home Layout " "One level;Laundry in basement;Able to live on main level with bedroom/bathroom  (stays on the 1st floor. daughter does the laundry in the basement. only went to basement occassionally. 6 KATHRYN with a rail)   Bathroom Shower/Tub Walk-in shower   Bathroom Toilet Standard   Bathroom Equipment   (towel bar next to toilet)   Home Equipment Walker;Cane   Prior Function   Level of [place] Independent with ADLs;Independent with functional mobility  (using the RW)   Lives With (S)  Alone   Receives Help From Family   IADLs Family/Friend/Other provides transportation;Family/Friend/Other provides meals;Independent with medication management  (has meals on wheels)   Falls in the last 6 months (S)  1 to 4  (\"just this 1\" but I don't really remember)   General   Family/Caregiver Present No   Cognition   Overall Cognitive Status Impaired   Arousal/Participation Alert   Orientation Level Oriented to person;Oriented to place;Oriented to time   Memory Decreased short term memory;Decreased recall of recent events;Decreased recall of precautions   Following Commands Follows one step commands with increased time or repetition   Comments Pt with noticable cognitive deficits throughout the session. Pt with difficulty understanding spinal precautions, sequencing motor tasks, and problem solving. See OT evaluation for additional details following cognitive assessment.   Subjective   Subjective \"I need pants\"   RUE Assessment   RUE Assessment WFL   LUE Assessment   LUE Assessment WFL   RLE Assessment   RLE Assessment WFL  (grossly 3+/5 throughout)   LLE Assessment   LLE Assessment WFL  (grossly 3+/5 throughout)   Light Touch   RLE Light Touch Grossly intact   LLE Light Touch Grossly intact   Bed Mobility   Rolling L 3  Moderate assistance   Additional items Assist x 1;Bedrails;Increased time required;Verbal cues;LE management   Supine to Sit 3  Moderate assistance   Additional items Assist x 1;Verbal cues;Impulsive;Increased time " required;Bedrails   Additional Comments increased difficulty sequencing motor task to perform log roll. Pt unable to complete task with verbal cues alone. Pt required moderate tactile and verbal cues to transition to sitting. once sitting at EOB, strong posterior trunk lean with difficulty self correcting.   Transfers   Sit to Stand 4  Minimal assistance   Additional items Assist x 1;Armrests;Increased time required;Verbal cues   Stand to Sit 4  Minimal assistance   Additional items Assist x 1;Armrests;Increased time required;Verbal cues   Ambulation/Elevation   Gait pattern Narrow JOHN;Shuffling;Excessively slow  (needed visual cues for progression of amb trial and directionality.)   Gait Assistance 4  Minimal assist   Additional items Assist x 1;Verbal cues;Tactile cues   Assistive Device Rolling walker   Distance 20ft, 5ft   Stair Management Assistance Not tested   Balance   Static Sitting Fair   Dynamic Sitting Fair -   Static Standing Fair -   Dynamic Standing Poor +   Ambulatory Poor +   Activity Tolerance   Activity Tolerance Patient tolerated treatment well;Patient limited by fatigue   Medical Staff Made Aware KIRTI Mullins   Nurse Made Aware CHELO Pabon   Assessment   Prognosis Good   Problem List Decreased strength;Decreased range of motion;Decreased endurance;Impaired balance;Decreased mobility;Decreased coordination;Decreased cognition;Decreased safety awareness;Orthopedic restrictions;Pain   Assessment Joy White is a 89 y.o. Female who presents to [facility] on 9/[facility] from home following a fall secondary to generalized weakness resulting in T7 compression fx. Orders for PT eval and treat received, w/ activity orders of up and out of bed as tolerated and fall precautions and spinal/ log roll precautions. Pt presents w/ comorbidities of HTN, depression, anemia, compression fx of T7, hypoxia. At baseline, pt mobilizes independently w/ a RW, and reports 1-4 falls in the last 6 months. Upon evaluation, pt presents  w/ the following deficits: weakness, impaired coordination, impaired balance, decreased endurance, and pain limiting functional mobility. Pt currently demonstrates mod assist for bed mobility, min assist for transfers, and min assist with a RW for ambulation. The patient's [facility] Basic Mobility Inpatient Short Form Raw Score is 15. A Raw score of less than or equal to 17 suggests the patient may benefit from discharge to post-acute rehabilitation services. Please also refer to the recommendation of the Physical Therapist for safe discharge planning. Based on current status, Level 2 rehab intensity is recommended at time of discharge. At this time, pt would benefit from continued skilled PT services, in the acute care setting, in order to address the abovementioned deficits and maximize independence and functional gains prior to discharge.   Barriers to Discharge Inaccessible home environment;Decreased caregiver support  (functional status)   Goals   Patient Goals none stated   STG Expiration Date 10/05/24   Short Term Goal #1 1. supine to sit utilizing log roll technique with supervision. 2. sit to stand with supervision. 3. ambulate with RW >100ft with supervision. 4. don/ doff TLSO with supervision.   PT Treatment Day 0   Plan   Treatment/Interventions Functional transfer training;LE strengthening/ROM;Therapeutic exercise;Endurance training;Cognitive reorientation;Patient/family training;Bed mobility;Gait training;Compensatory technique education;Spoke to nursing;OT   PT Frequency 3-5x/wk   Discharge Recommendation   Rehab Resource Intensity Level, PT II (Moderate Resource Intensity)   AM-PAC Basic Mobility Inpatient   Turning in Flat Bed Without Bedrails 2   Lying on Back to Sitting on Edge of Flat Bed Without Bedrails 2   Moving Bed to Chair 3   Standing Up From Chair Using Arms 3   Walk in Room 3   Climb 3-5 Stairs With Railing 2   Basic Mobility Inpatient Raw Score 15   Basic Mobility Standardized Score 36.97    Alli Jesus Highest Level Of Mobility   -[facility] Goal 4: Move to chair/commode   -HL Achieved 6: Walk 10 steps or more   End of Consult   Patient Position at End of Consult Bedside chair;Bed/Chair alarm activated;All needs within reach       The patient's AM-PAC Basic Mobility Inpatient Short Form Raw Score is 15, Standardized Score is 36.97. A standardized score less than 38.32 (raw score of 16) suggests the patient may benefit from discharge to post-acute rehabilitation services which may not coincide with above PT recommendations. However please refer to therapist recommendation for discharge planning given other factors that may influence destination.      Pt would benefit from skilled inpatient PT during this admission in order to facilitate progress towards goals to maximize functional independence    Brent Caro, PT

## 2024-09-20 NOTE — PLAN OF CARE
Problem: Potential for Falls  Goal: Patient will remain free of falls  Description: INTERVENTIONS:  - Educate patient/family on patient safety including physical limitations  - Instruct patient to call for assistance with activity   - Consult OT/PT to assist with strengthening/mobility   - Keep Call bell within reach  - Keep bed low and locked with side rails adjusted as appropriate  - Keep care items and personal belongings within reach  - Initiate and maintain comfort rounds  - Make Fall Risk Sign visible to staff  - Offer Toileting every 2 Hours, in advance of need  - Initiate/Maintain bed alarm  - Obtain necessary fall risk management equipment: socks  - Apply yellow socks and bracelet for high fall risk patients  - Consider moving patient to room near nurses station  Outcome: Progressing     Problem: INFECTION - ADULT  Goal: Absence or prevention of progression during hospitalization  Description: INTERVENTIONS:  - Assess and monitor for signs and symptoms of infection  - Monitor lab/diagnostic results  - Monitor all insertion sites, i.e. indwelling lines, tubes, and drains  - Administer medications as ordered  - Instruct and encourage patient and family to use good hand hygiene technique  - Identify and instruct in appropriate isolation precautions for identified infection/condition  Outcome: Progressing     Problem: SAFETY ADULT  Goal: Patient will remain free of falls  Description: INTERVENTIONS:  - Educate patient/family on patient safety including physical limitations  - Instruct patient to call for assistance with activity   - Consult OT/PT to assist with strengthening/mobility   - Keep Call bell within reach  - Keep bed low and locked with side rails adjusted as appropriate  - Keep care items and personal belongings within reach  - Initiate and maintain comfort rounds  - Make Fall Risk Sign visible to staff  - Offer Toileting every 2 Hours, in advance of need  - Initiate/Maintain bed alarm  - Obtain  necessary fall risk management equipment: socks  - Apply yellow socks and bracelet for high fall risk patients  - Consider moving patient to room near nurses station  Outcome: Progressing  Goal: Maintain or return to baseline ADL function  Description: INTERVENTIONS:  -  Assess patient's ability to carry out ADLs; assess patient's baseline for ADL function and identify physical deficits which impact ability to perform ADLs (bathing, care of mouth/teeth, toileting, grooming, dressing, etc.)  - Assess/evaluate cause of self-care deficits   - Assess range of motion  - Assess patient's mobility; develop plan if impaired  - Assess patient's need for assistive devices and provide as appropriate  - Encourage maximum independence but intervene and supervise when necessary  - Involve family in performance of ADLs  - Assess for home care needs following discharge   - Consider OT consult to assist with ADL evaluation and planning for discharge  - Provide patient education as appropriate  Outcome: Progressing  Goal: Maintains/Returns to pre admission functional level  Description: INTERVENTIONS:  - Perform AM-PAC 6 Click Basic Mobility/ Daily Activity assessment daily.  - Set and communicate daily mobility goal to care team and patient/family/caregiver.   - Collaborate with rehabilitation services on mobility goals if consulted  - Perform Range of Motion 3 times a day.  - Reposition patient every 2 hours.  - Dangle patient 3 times a day  - Stand patient 3 times a day  - Ambulate patient 3 times a day  - Out of bed to chair 3 times a day   - Out of bed for meals 3 times a day  - Out of bed for toileting  - Record patient progress and toleration of activity level   Outcome: Progressing     Problem: DISCHARGE PLANNING  Goal: Discharge to home or other facility with appropriate resources  Description: INTERVENTIONS:  - Identify barriers to discharge w/patient and caregiver  - Arrange for needed discharge resources and  transportation as appropriate  - Identify discharge learning needs (meds, wound care, etc.)  - Arrange for interpretive services to assist at discharge as needed  - Refer to Case Management Department for coordinating discharge planning if the patient needs post-hospital services based on physician/advanced practitioner order or complex needs related to functional status, cognitive ability, or social support system  Outcome: Progressing

## 2024-09-20 NOTE — ASSESSMENT & PLAN NOTE
Noted mild loss of height of T7 vertebrae, compression fracture  TLSO brace ordered  PT and OT evals pending   Pain control

## 2024-09-20 NOTE — PLAN OF CARE
Problem: PHYSICAL THERAPY ADULT  Goal: Performs mobility at highest level of function for planned discharge setting.  See evaluation for individualized goals.  Description: Treatment/Interventions: Functional transfer training, LE strengthening/ROM, Therapeutic exercise, Endurance training, Cognitive reorientation, Patient/family training, Bed mobility, Gait training, Compensatory technique education, Spoke to nursing, OT          See flowsheet documentation for full assessment, interventions and recommendations.  Note: Prognosis: Good  Problem List: Decreased strength, Decreased range of motion, Decreased endurance, Impaired balance, Decreased mobility, Decreased coordination, Decreased cognition, Decreased safety awareness, Orthopedic restrictions, Pain  Assessment: Joy White is a 89 y.o. Female who presents [facility] on 9/19[facility] from home following a fall secondary to generalized weakness resulting in T7 compression fx. Orders for PT eval and treat received, w/ activity orders of up and out of bed as tolerated and fall precautions and spinal/ log roll precautions. Pt presents w/ comorbidities of HTN, depression, anemia, compression fx of T7, hypoxia. At baseline, pt mobilizes independently w/ a RW, and reports 1-4 falls in the last 6 months. Upon evaluation, pt presents w/ the following deficits: weakness, impaired coordination, impaired balance, decreased endurance, and pain limiting functional mobility. Pt currently demonstrates mod assist for bed mobility, min assist for transfers, and min assist with a RW for ambulation. The patient's AM-PAC Basic Mobility Inpatient Short Form Raw Score is 15. A Raw score of less than or equal to 17 suggests the patient may benefit from discharge to post-acute rehabilitation services. Please also refer to the recommendation of the Physical Therapist for safe discharge planning. Based on current status, Level 2 rehab intensity is recommended at time of discharge. At this  time, pt would benefit from continued skilled PT services, in the acute care setting, in order to address the abovementioned deficits and maximize independence and functional gains prior to discharge.  Barriers to Discharge: Inaccessible home environment, Decreased caregiver support (functional status)     Rehab Resource Intensity Level, PT: II (Moderate Resource Intensity)    See flowsheet documentation for full assessment.

## 2024-09-20 NOTE — OCCUPATIONAL THERAPY NOTE
"  Occupational Therapy Evaluation      Joy White    9/20/2024    Principal Problem:    Generalized weakness  Active Problems:    Hypertension    Depression    Anemia, unspecified    Compression fracture of T7 vertebra (HCC)    Hypoxia      Past Medical History:   Diagnosis Date    Cardiac disease     CHF (congestive heart failure) (HCC)     Colitis     Hypertension     Psychiatric disorder     depression       Past Surgical History:   Procedure Laterality Date    COLONOSCOPY  09/27/2010    VAGINAL DELIVERY      x2        09/20/24 1150   OT Last Visit   OT Visit Date 09/20/24   Note Type   Note type Evaluation   Pain Assessment   Pain Assessment Tool Blanchard-Baker FACES   Blanchard-Baker FACES Pain Rating 6   Pain Location/Orientation Location: Leg  (\"they always hurt\")   Restrictions/Precautions   Weight Bearing Precautions Per Order No   Braces or Orthoses TLSO   Other Precautions Cognitive;Chair Alarm;Bed Alarm;Fall Risk;Pain   Home Living   Type of Home House   Home Layout One level;Laundry in basement  (Daughter does the laundry in the basement. 6 KATHRYN with a rail.)   Bathroom Shower/Tub Walk-in shower   Bathroom Toilet Standard   Home Equipment Walker;Cane   Prior Function   Level of Crane Independent with ADLs;Independent with functional mobility  (using the RW)   Lives With (S)  Alone   Receives Help From Family   IADLs Family/Friend/Other provides transportation;Family/Friend/Other provides meals;Independent with medication management  (has meals on wheels)   Falls in the last 6 months (S)  1 to 4  (\"just this 1\". Per chart, pt has had multiple recent falls.)   Subjective   Subjective \"Wow! When did that couch get there?\"   ADL   Eating Assistance 7  Independent   Grooming Assistance 7  Independent   UB Bathing Assistance 4  Minimal Assistance   LB Bathing Assistance 2  Maximal Assistance   UB Dressing Assistance 4  Minimal Assistance   LB Dressing Assistance 2  Maximal Assistance   Toileting Assistance  " "3  Moderate Assistance   Additional Comments Edu on spinal precautions and don/doff TLSO.   Bed Mobility   Rolling L 3  Moderate assistance   Additional items Assist x 1;Verbal cues;Impulsive;Increased time required;Bedrails;HOB elevated   Supine to Sit 3  Moderate assistance   Additional items Assist x 1;Verbal cues;Impulsive;Increased time required;Bedrails;HOB elevated   Additional Comments Edu on log roll technique. Pt with difficulty following commands.   Transfers   Sit to Stand 4  Minimal assistance   Additional items Assist x 1;Verbal cues;Impulsive;Increased time required   Stand to Sit 4  Minimal assistance   Additional items Assist x 1;Verbal cues;Impulsive;Increased time required   Stand pivot 4  Minimal assistance   Additional items Assist x 1;Verbal cues;Impulsive;Increased time required  (RW)   Additional Comments Pt with difficulty sequencing/direction following   Functional Mobility   Functional Mobility 4  Minimal assistance   Additional Comments x1; verbal cues   Additional items Rolling walker   Activity Tolerance   Activity Tolerance Patient tolerated treatment well   Medical Staff Made Aware PT Brent   RUE Assessment   RUE Assessment WFL   LUE Assessment   LUE Assessment WFL   Cognition   Overall Cognitive Status (S)  Impaired   Arousal/Participation Alert   Attention Difficulty attending to directions   Orientation Level Oriented to person;Oriented to place;Oriented to time   Memory Decreased short term memory;Decreased recall of recent events;Decreased recall of precautions   Following Commands Follows one step commands with increased time or repetition   Comments (S)  Pt with noticably impaired short term memory. Pt forgot while she was using commode that she already had underwear on. 10 mins after explaining how to turn on/off room phone, pt unable to recall (despite phone with \"on/off\" written on it). Upon getting out of bed, pt comments on the \"nice sofa\" saying \"when did that get here\" " (sofa remains in room at all times). Pt with limited ability to understand and adhere to education regarding back safety, spinal precautions, and TLSO. Concerned about pt's ability to live alone/manage medications. Performed a cognitive evaluation; see details below. Recommend 24/7 cognitive support at this time.   Cognition Assessment Tools SLUMS   Score 15  (out of 30. See details below)   Assessment   Limitation Decreased ADL status;Decreased Safe judgement during ADL;Decreased cognition;Decreased endurance;Decreased self-care trans;Decreased high-level ADLs   Prognosis Guarded   Assessment Pt is a 89 y.o. female seen for OT evaluation at [facility], admitted 9/19/2024 w/ Generalized weakness.  OT completed extensive review of pt's medical and social history. Comorbidities affecting pt's functional performance at time of assessment include: HTN, depression, anemia, compression fx of T7, hypoxia. Personal factors affecting pt at time of IE include:steps to enter environment, limited home support, behavioral pattern, difficulty performing ADLS, difficulty performing IADLS , limited insight into deficits, decreased initiation and engagement , health management , and environment. Prior to admission, pt was living in 1SH (laundry in basement), 6STE, ALONE, and was independent with ADL with RW, independent with IADL except laundry (in basement) and driving. Upon evaluation, pt presents to OT below baseline due to the following performance deficits: weakness, decreased strength, decreased balance, decreased tolerance, impaired memory, impaired sequencing, impaired problem solving, impulsivity, decreased safety awareness, and increased pain. Highly concerned regarding pt's high fall risk/ frequency of falls and poor cognition. Pt to benefit from continued skilled OT tx while in the hospital to address deficits as defined above and maximize level of functional independence w ADL's and functional mobility.  Occupational Performance areas to address include: grooming, bathing/shower, toilet hygiene, dressing, functional mobility, and functional transfers, bed mobility . The patient's raw score on the AM-PAC Daily Activity inpatient short form is 17, standardized score is 37.26, less than 39.4. Patients at this level are likely to benefit from DC to post-acute rehabilitation services. Based on findings, pt is of high complexity, due to medical comorbidities. Pt seen as a co-eval with PT due to the patient's co-morbidities, clinically unstable presentation, and present impairments which are a regression from the patient's baseline. At this time, OT recommendations at time of discharge are level 2.   Goals   Patient Goals none stated   Plan   Treatment Interventions ADL retraining;Functional transfer training;UE strengthening/ROM;Endurance training;Cognitive reorientation;Patient/family training;Equipment evaluation/education;Compensatory technique education;Continued evaluation;Energy conservation   Goal Expiration Date 09/30/24   OT Frequency 3-5x/wk   Discharge Recommendation   Recommendation (S)    (Question pt ability to make medical decisions. If refusing rehab, recommend neuropsych evaluation for competency.)   Rehab Resource Intensity Level, OT II (Moderate Resource Intensity)   AM-PAC Daily Activity Inpatient   Lower Body Dressing 2   Bathing 2   Toileting 3   Upper Body Dressing 3   Grooming 3   Eating 4   Daily Activity Raw Score 17   Daily Activity Standardized Score (Calc for Raw Score >=11) 37.26   -PAC Applied Cognition Inpatient   Following a Speech/Presentation 2   Understanding Ordinary Conversation 3   Taking Medications 2   Remembering Where Things Are Placed or Put Away 2   Remembering List of 4-5 Errands 2   Taking Care of Complicated Tasks 1   Applied Cognition Raw Score 12   Applied Cognition Standardized Score 28.82   SLUMS   What day of the week is it? 1   What is the year? 1   What state are we  in? 1   How much did you spend? 1   How much do you have left? 0   Name animals? 2   Oject recall? 0   Repeat numbers backwards? 1   Draw a clock? 4   Identify a triangle 1   Determine size? 1   What was the females name? 2   When did she go back to work? 0   What work did she do? 0   What state did she live in? 0   Calculated SLUMS Score 15/ 30   SLUMS Comments (S)  Indicates dementia, which is consistent with pt's presentation during evaluation     Pt will achieve the following goals within 10 days.    *Pt will complete grooming with independence.    *Pt will complete UB bathing and dressing with independence, including don/doff TLSO.    *Pt will complete LB bathing and dressing with modified independence, adhering to spinal precautions .    *Pt will complete toileting (hygiene and clothing management) with independence    *Pt will complete bed mobility with modified independence, with bed flat and no side rail to prep for purposeful tasks, utilizing log roll technique.    *Pt will perform functional transfers with modified independence in order to complete ADL routine.    *Pt will increase standing tolerance to 5+ minutes in order to complete ADL routine.    *Pt will complete item retrieval and light home management with supervision while demonstrating good safety.    *Pt will demonstrate increased activity tolerance in order to complete ADL routine.    *Pt will sit on EOB for 10+ minutes for increased safety with seated activity tolerance during ADL tasks.    *Pt will identify 3/3 spinal precautions to ensure safety upon discharge.    Dian Liu MS, OTR/L

## 2024-09-20 NOTE — PROGRESS NOTES
Progress Note - Hospitalist   Name: Joy White 89 y.o. female I MRN: [id number]  Unit/Bed#: -01 I Date of Admission: 9/19/2024   Date of Service: 9/20/2024 I Hospital Day: 1    Assessment & Plan  Generalized weakness  She has a generalized weakness and falls over the past at least 4 weeks at home.  She lives home alone but does have visiting Meals on Wheels and her daughter nearby.  Her daughter has wanted her to be evaluated and possibly go to rehab  Suspect her generalized weakness and recurrent falls are due to deconditioning.  She has nonfocal neurologic exam, no evidence of infection.  PT and OT evaluations pending   Hypoxia  She was hypoxic with ambulation in the ER  CTA chest without PE, pneumonia, effusion or volume overload  No clear etiology identified at this time, check ambulatory pulse ox with PT  Denies SOB today, SpO2 within normal range   Compression fracture of T7 vertebra (HCC)  Noted mild loss of height of T7 vertebrae, compression fracture  TLSO brace ordered  PT and OT evals pending   Pain control   Hypertension  Her blood pressure was elevated at 163/94  Resume home regimen of amlodipine, lisinopril, bystolic  Monitor blood pressure  Depression  Mood is stable presently  Anemia, unspecified  Hx of anemia, hgb now WNL    VTE Pharmacologic Prophylaxis: VTE Score: 3 Moderate Risk (Score 3-4) - Pharmacological DVT Prophylaxis Ordered: enoxaparin (Lovenox).    Mobility:   Basic Mobility Inpatient Raw Score: 16  JH-HLM Goal: 5: Stand one or more mins  JH-HLM Achieved: 5: Stand (1 or more minutes)  JH-HLM Goal achieved. Continue to encourage appropriate mobility.    Patient Centered Rounds: I performed bedside rounds with nursing staff today.   Discussions with Specialists or Other Care Team Provider: PT     Education and Discussions with Family / Patient: Attempted to update  (daughter) via phone. Left voicemail.     Current Length of Stay: 1 day(s)  Current Patient Status:  Inpatient   Certification Statement: The patient will continue to require additional inpatient hospital stay due to PT/OT evals  Discharge Plan: Anticipate discharge in 24-48 hrs to discharge location to be determined pending rehab evaluations.    Code Status: Level 1 - Full Code    Subjective   Patient endorses mild back pain consistent with known compression fx. No additional pain or complaints.     Objective     Vitals:   Temp (24hrs), Av.6 °F (36.4 °C), Min:97.1 °F (36.2 °C), Max:98.2 °F (36.8 °C)    Temp:  [97.1 °F (36.2 °C)-98.2 °F (36.8 °C)] 98.1 °F (36.7 °C)  HR:  [] 86  Resp:  [17-22] 20  BP: (153-180)/(72-94) 155/88  SpO2:  [80 %-96 %] 96 %  Body mass index is 29.76 kg/m².     Input and Output Summary (last 24 hours):     Intake/Output Summary (Last 24 hours) at 2024 1238  Last data filed at 2024 0958  Gross per 24 hour   Intake 2150 ml   Output --   Net 2150 ml       Physical Exam  Vitals and nursing note reviewed.   Constitutional:       General: She is not in acute distress.     Appearance: She is not ill-appearing.   HENT:      Head: Normocephalic and atraumatic.      Nose: Nose normal.   Eyes:      General:         Right eye: No discharge.         Left eye: No discharge.      Extraocular Movements: Extraocular movements intact.      Conjunctiva/sclera: Conjunctivae normal.   Cardiovascular:      Rate and Rhythm: Normal rate and regular rhythm.      Heart sounds: Normal heart sounds. No murmur heard.  Pulmonary:      Effort: Pulmonary effort is normal. No respiratory distress.      Breath sounds: Normal breath sounds. No wheezing, rhonchi or rales.   Abdominal:      General: Bowel sounds are normal.      Palpations: Abdomen is soft.      Tenderness: There is no abdominal tenderness. There is no guarding.   Musculoskeletal:      Right lower leg: No edema.      Left lower leg: No edema.   Skin:     General: Skin is warm and dry.   Neurological:      General: No focal deficit present.       Mental Status: She is alert and oriented to person, place, and time.      Cranial Nerves: No cranial nerve deficit.   Psychiatric:         Mood and Affect: Mood normal.         Behavior: Behavior normal.          Lines/Drains:  Lines/Drains/Airways       Active Status       None                            Lab Results: I have reviewed the following results: CBC/BMP:   .     09/19/24  1244 09/20/24  0541   WBC 6.46 7.15   HGB 14.4 13.9   HCT 44.9 45.1    204   SODIUM 139 140   K 3.3* 3.7    106   CO2 30 28   BUN 8 8   CREATININE 0.52* 0.42*   GLUC 134 112   MG 1.8*  --     , LFTs:   .     09/19/24  1244   AST 12*   ALT 11   ALB 3.7   TBILI 0.52   ALKPHOS 75    , Urinalysis:   Lab Results   Component Value Date    COLORU Light Yellow 09/19/2024    CLARITYU Clear 09/19/2024    SPECGRAV 1.010 09/19/2024    PHUR 7.0 09/19/2024    LEUKOCYTESUR Negative 09/19/2024    NITRITE Negative 09/19/2024    GLUCOSEU Negative 09/19/2024    KETONESU Negative 09/19/2024    BILIRUBINUR Negative 09/19/2024    BLOODU Negative 09/19/2024      Results from last 7 days   Lab Units 09/20/24  0541   WBC Thousand/uL 7.15   HEMOGLOBIN g/dL 13.9   HEMATOCRIT % 45.1   PLATELETS Thousands/uL 204   SEGS PCT % 61   LYMPHO PCT % 24   MONO PCT % 13*   EOS PCT % 1     Results from last 7 days   Lab Units 09/20/24  0541 09/19/24  1244   SODIUM mmol/L 140 139   POTASSIUM mmol/L 3.7 3.3*   CHLORIDE mmol/L 106 102   CO2 mmol/L 28 30   BUN mg/dL 8 8   CREATININE mg/dL 0.42* 0.52*   ANION GAP mmol/L 6 7   CALCIUM mg/dL 8.5 8.9   ALBUMIN g/dL  --  3.7   TOTAL BILIRUBIN mg/dL  --  0.52   ALK PHOS U/L  --  75   ALT U/L  --  11   AST U/L  --  12*   GLUCOSE RANDOM mg/dL 112 134                       Recent Cultures (last 7 days):         Imaging Review: Reviewed radiology reports from this admission including: CT chest and CT Thoracolumbar spine.  Other Studies: No additional pertinent studies reviewed.    Last 24 Hours Medication List:      Current Facility-Administered Medications:     acetaminophen (TYLENOL) tablet 650 mg, Q6H PRN    amitriptyline (ELAVIL) tablet 50 mg, HS    amLODIPine (NORVASC) tablet 5 mg, Daily    calcium carbonate (TUMS) chewable tablet 500 mg, BID PRN    docusate sodium (COLACE) capsule 100 mg, BID PRN    dorzolamide (TRUSOPT) ophthalmic solution 1 drop, BID    enoxaparin (LOVENOX) subcutaneous injection 40 mg, Daily    latanoprost (XALATAN) 0.005 % ophthalmic solution 1 drop, HS    lidocaine (LIDODERM) 5 % patch 1 patch, Daily    lisinopril (ZESTRIL) tablet 40 mg, Daily    meclizine (ANTIVERT) tablet 12.5 mg, TID PRN    melatonin tablet 6 mg, HS PRN    nebivolol (BYSTOLIC) tablet 10 mg, BID    ondansetron (ZOFRAN) injection 4 mg, Q4H PRN    oxybutynin (DITROPAN-XL) 24 hr tablet 5 mg, Daily    polyethylene glycol (MIRALAX) packet 17 g, Daily PRN    Administrative Statements   Today, Patient Was Seen By: Dian Fuentes PA-C  I have spent a total time of 35 minutes in caring for this patient on the day of the visit/encounter including Diagnostic results, Patient and family education, Documenting in the medical record, Reviewing / ordering tests, medicine, procedures  , Obtaining or reviewing history  , and Communicating with other healthcare professionals .    **Please Note: This note may have been constructed using a voice recognition system.**

## 2024-09-20 NOTE — PLAN OF CARE
Problem: OCCUPATIONAL THERAPY ADULT  Goal: Performs self-care activities at highest level of function for planned discharge setting.  See evaluation for individualized goals.  Description:   Outcome: Progressing  Note: Limitation: Decreased ADL status, Decreased Safe judgement during ADL, Decreased cognition, Decreased endurance, Decreased self-care trans, Decreased high-level ADLs  Prognosis: Guarded  Assessment: Pt is a 89 y.o. female seen for OT evaluation at [facility], admitted 9/19/2024 w/ Generalized weakness.  OT completed extensive review of pt's medical and social history. Comorbidities affecting pt's functional performance at time of assessment include: HTN, depression, anemia, compression fx of T7, hypoxia. Personal factors affecting pt at time of IE include:steps to enter environment, limited home support, behavioral pattern, difficulty performing ADLS, difficulty performing IADLS , limited insight into deficits, decreased initiation and engagement , health management , and environment. Prior to admission, pt was living in 1SH (laundry in basement), 6STE, ALONE, and was independent with ADL with RW, independent with IADL except laundry (in basement) and driving. Upon evaluation, pt presents to OT below baseline due to the following performance deficits: weakness, decreased strength, decreased balance, decreased tolerance, impaired memory, impaired sequencing, impaired problem solving, impulsivity, decreased safety awareness, and increased pain. Highly concerned regarding pt's high fall risk/ frequency of falls and poor cognition. Pt to benefit from continued skilled OT tx while in the hospital to address deficits as defined above and maximize level of functional independence w ADL's and functional mobility. Occupational Performance areas to address include: grooming, bathing/shower, toilet hygiene, dressing, functional mobility, and functional transfers, bed mobility . The patient's raw score  on the AM-PAC Daily Activity inpatient short form is 17, standardized score is 37.26, less than 39.4. Patients at this level are likely to benefit from DC to post-acute rehabilitation services. Based on findings, pt is of high complexity, due to medical comorbidities. Pt seen as a co-eval with PT due to the patient's co-morbidities, clinically unstable presentation, and present impairments which are a regression from the patient's baseline. At this time, OT recommendations at time of discharge are level 2.  Recommendation: (S)  (Question pt ability to make medical decisions. If refusing rehab, recommend neuropsych evaluation for competency.)  Rehab Resource Intensity Level, OT: II (Moderate Resource Intensity)

## 2024-09-20 NOTE — ASSESSMENT & PLAN NOTE
She was hypoxic with ambulation in the ER  CTA chest without PE, pneumonia, effusion or volume overload  No clear etiology identified at this time, check ambulatory pulse ox with PT  Denies SOB today, SpO2 within normal range

## 2024-09-21 LAB
ANION GAP SERPL CALCULATED.3IONS-SCNC: 5 MMOL/L (ref 4–13)
BUN SERPL-MCNC: 10 MG/DL (ref 5–25)
CALCIUM SERPL-MCNC: 8.3 MG/DL (ref 8.4–10.2)
CHLORIDE SERPL-SCNC: 106 MMOL/L (ref 96–108)
CO2 SERPL-SCNC: 27 MMOL/L (ref 21–32)
CREAT SERPL-MCNC: 0.46 MG/DL (ref 0.6–1.3)
ERYTHROCYTE [DISTWIDTH] IN BLOOD BY AUTOMATED COUNT: 12.5 % (ref 11.6–15.1)
GFR SERPL CREATININE-BSD FRML MDRD: 89 ML/MIN/1.73SQ M
GLUCOSE SERPL-MCNC: 103 MG/DL (ref 65–140)
HCT VFR BLD AUTO: 42.7 % (ref 34.8–46.1)
HGB BLD-MCNC: 13.4 G/DL (ref 11.5–15.4)
MCH RBC QN AUTO: 30.5 PG (ref 26.8–34.3)
MCHC RBC AUTO-ENTMCNC: 31.4 G/DL (ref 31.4–37.4)
MCV RBC AUTO: 97 FL (ref 82–98)
PLATELET # BLD AUTO: 210 THOUSANDS/UL (ref 149–390)
PMV BLD AUTO: 10.1 FL (ref 8.9–12.7)
POTASSIUM SERPL-SCNC: 4.2 MMOL/L (ref 3.5–5.3)
RBC # BLD AUTO: 4.39 MILLION/UL (ref 3.81–5.12)
SODIUM SERPL-SCNC: 138 MMOL/L (ref 135–147)
WBC # BLD AUTO: 7.42 THOUSAND/UL (ref 4.31–10.16)

## 2024-09-21 PROCEDURE — 99232 SBSQ HOSP IP/OBS MODERATE 35: CPT

## 2024-09-21 PROCEDURE — 85027 COMPLETE CBC AUTOMATED: CPT | Performed by: INTERNAL MEDICINE

## 2024-09-21 PROCEDURE — 80048 BASIC METABOLIC PNL TOTAL CA: CPT | Performed by: INTERNAL MEDICINE

## 2024-09-21 RX ORDER — DOCUSATE SODIUM 100 MG/1
100 CAPSULE, LIQUID FILLED ORAL 2 TIMES DAILY
Status: DISCONTINUED | OUTPATIENT
Start: 2024-09-21 | End: 2024-09-22 | Stop reason: HOSPADM

## 2024-09-21 RX ORDER — POLYETHYLENE GLYCOL 3350 17 G/17G
17 POWDER, FOR SOLUTION ORAL DAILY PRN
Status: DISCONTINUED | OUTPATIENT
Start: 2024-09-21 | End: 2024-09-21

## 2024-09-21 RX ORDER — POLYETHYLENE GLYCOL 3350 17 G/17G
17 POWDER, FOR SOLUTION ORAL DAILY
Status: DISCONTINUED | OUTPATIENT
Start: 2024-09-21 | End: 2024-09-22 | Stop reason: HOSPADM

## 2024-09-21 RX ADMIN — AMITRIPTYLINE HYDROCHLORIDE 50 MG: 50 TABLET, FILM COATED ORAL at 21:05

## 2024-09-21 RX ADMIN — LISINOPRIL 40 MG: 20 TABLET ORAL at 08:50

## 2024-09-21 RX ADMIN — DORZOLAMIDE HCL 1 DROP: 20 SOLUTION/ DROPS OPHTHALMIC at 08:50

## 2024-09-21 RX ADMIN — AMLODIPINE BESYLATE 5 MG: 5 TABLET ORAL at 08:49

## 2024-09-21 RX ADMIN — MECLIZINE HYDROCHLORIDE 12.5 MG: 12.5 TABLET ORAL at 08:49

## 2024-09-21 RX ADMIN — NEBIVOLOL 10 MG: 5 TABLET ORAL at 08:50

## 2024-09-21 RX ADMIN — DOCUSATE SODIUM 100 MG: 100 CAPSULE, LIQUID FILLED ORAL at 17:15

## 2024-09-21 RX ADMIN — OXYBUTYNIN CHLORIDE 5 MG: 5 TABLET, EXTENDED RELEASE ORAL at 08:49

## 2024-09-21 RX ADMIN — POLYETHYLENE GLYCOL 3350 17 G: 17 POWDER, FOR SOLUTION ORAL at 11:28

## 2024-09-21 RX ADMIN — DOCUSATE SODIUM 100 MG: 100 CAPSULE, LIQUID FILLED ORAL at 08:49

## 2024-09-21 RX ADMIN — NEBIVOLOL 10 MG: 5 TABLET ORAL at 17:15

## 2024-09-21 RX ADMIN — LIDOCAINE 1 PATCH: 700 PATCH TOPICAL at 11:31

## 2024-09-21 RX ADMIN — ENOXAPARIN SODIUM 40 MG: 40 INJECTION SUBCUTANEOUS at 08:49

## 2024-09-21 RX ADMIN — ACETAMINOPHEN 650 MG: 325 TABLET ORAL at 15:28

## 2024-09-21 RX ADMIN — DORZOLAMIDE HCL 1 DROP: 20 SOLUTION/ DROPS OPHTHALMIC at 17:15

## 2024-09-21 RX ADMIN — LATANOPROST 1 DROP: 50 SOLUTION OPHTHALMIC at 21:05

## 2024-09-21 NOTE — PLAN OF CARE

## 2024-09-21 NOTE — ASSESSMENT & PLAN NOTE
She has a generalized weakness and falls over the past at least 4 weeks at home.  She lives home alone but does have visiting Meals on Wheels and her daughter nearby.  Her daughter has wanted her to be evaluated and possibly go to rehab  Suspect her generalized weakness and recurrent falls are due to deconditioning.  She has nonfocal neurologic exam, no evidence of infection.  PT and OT evaluations --> recommend rehab  DC to rehab tomorrow

## 2024-09-21 NOTE — PLAN OF CARE
Problem: Potential for Falls  Goal: Patient will remain free of falls  Description: INTERVENTIONS:  - Educate patient/family on patient safety including physical limitations  - Instruct patient to call for assistance with activity   - Consult OT/PT to assist with strengthening/mobility   - Kee2p Call bell within reach  - Keep bed low and locked with side rails adjusted as appropriate  - Keep care items and personal belongings within reach  - Initiate and maintain comfort rounds  - Make Fall Risk Sign visible to staff  - Offer Toileting every 2 Hours, in advance of need  - Initiate/Maintain bed alarm  - Obtain necessary fall risk management equipment: socks  - Apply yellow socks and bracelet for high fall risk patients  - Consider moving patient to room near nurses station  Outcome: Progressing     Problem: PAIN - ADULT  Goal: Verbalizes/displays adequate comfort level or baseline comfort level  Description: Interventions:  - Encourage patient to monitor pain and request assistance  - Assess pain using appropriate pain scale  - Administer analgesics based on type and severity of pain and evaluate response  - Implement non-pharmacological measures as appropriate and evaluate response  - Consider cultural and social influences on pain and pain management  - Notify physician/advanced practitioner if interventions unsuccessful or patient reports new pain  Outcome: Progressing     Problem: INFECTION - ADULT  Goal: Absence or prevention of progression during hospitalization  Description: INTERVENTIONS:  - Assess and monitor for signs and symptoms of infection  - Monitor lab/diagnostic results  - Monitor all insertion sites, i.e. indwelling lines, tubes, and drains  - Administer medications as ordered  - Instruct and encourage patient and family to use good hand hygiene technique  - Identify and instruct in appropriate isolation precautions for identified infection/condition  Outcome: Progressing     Problem: SAFETY  ADULT  Goal: Patient will remain free of falls  Description: INTERVENTIONS:  - Educate patient/family on patient safety including physical limitations  - Instruct patient to call for assistance with activity   - Consult OT/PT to assist with strengthening/mobility   - Kee2p Call bell within reach  - Keep bed low and locked with side rails adjusted as appropriate  - Keep care items and personal belongings within reach  - Initiate and maintain comfort rounds  - Make Fall Risk Sign visible to staff  - Offer Toileting every 2 Hours, in advance of need  - Initiate/Maintain bed alarm  - Obtain necessary fall risk management equipment: socks  - Apply yellow socks and bracelet for high fall risk patients  - Consider moving patient to room near nurses station  Outcome: Progressing  Goal: Maintain or return to baseline ADL function  Description: INTERVENTIONS:  -  Assess patient's ability to carry out ADLs; assess patient's baseline for ADL function and identify physical deficits which impact ability to perform ADLs (bathing, care of mouth/teeth, toileting, grooming, dressing, etc.)  - Assess/evaluate cause of self-care deficits   - Assess range of motion  - Assess patient's mobility; develop plan if impaired  - Assess patient's need for assistive devices and provide as appropriate  - Encourage maximum independence but intervene and supervise when necessary  - Involve family in performance of ADLs  - Assess for home care needs following discharge   - Consider OT consult to assist with ADL evaluation and planning for discharge  - Provide patient education as appropriate  Outcome: Progressing  Goal: Maintains/Returns to pre admission functional level  Description: INTERVENTIONS:  - Perform AM-PAC 6 Click Basic Mobility/ Daily Activity assessment daily.  - Set and communicate daily mobility goal to care team and patient/family/caregiver.   - Collaborate with rehabilitation services on mobility goals if consulted  - Perform Range  of Motion 3 times a day.  - Reposition patient every 2 hours.  - Dangle patient 3 times a day  - Stand patient 3 times a day  - Ambulate patient 3 times a day  - Out of bed to chair 3 times a day   - Out of bed for meals 3 times a day  - Out of bed for toileting  - Record patient progress and toleration of activity level   Outcome: Progressing     Problem: DISCHARGE PLANNING  Goal: Discharge to home or other facility with appropriate resources  Description: INTERVENTIONS:  - Identify barriers to discharge w/patient and caregiver  - Arrange for needed discharge resources and transportation as appropriate  - Identify discharge learning needs (meds, wound care, etc.)  - Arrange for interpretive services to assist at discharge as needed  - Refer to Case Management Department for coordinating discharge planning if the patient needs post-hospital services based on physician/advanced practitioner order or complex needs related to functional status, cognitive ability, or social support system  Outcome: Progressing     Problem: Knowledge Deficit  Goal: Patient/family/caregiver demonstrates understanding of disease process, treatment plan, medications, and discharge instructions  Description: Complete learning assessment and assess knowledge base.  Interventions:  - Provide teaching at level of understanding  - Provide teaching via preferred learning methods  Outcome: Progressing     Problem: Prexisting or High Potential for Compromised Skin Integrity  Goal: Skin integrity is maintained or improved  Description: INTERVENTIONS:  - Identify patients at risk for skin breakdown  - Assess and monitor skin integrity  - Assess and monitor nutrition and hydration status  - Monitor labs   - Assess for incontinence   - Turn and reposition patient  - Assist with mobility/ambulation  - Relieve pressure over bony prominences  - Avoid friction and shearing  - Provide appropriate hygiene as needed including keeping skin clean and dry  -  Evaluate need for skin moisturizer/barrier cream  - Collaborate with interdisciplinary team   - Patient/family teaching  - Consider wound care consult   Outcome: Progressing

## 2024-09-21 NOTE — ASSESSMENT & PLAN NOTE
Noted mild loss of height of T7 vertebrae, compression fracture  TLSO brace  PT and OT evals --> rehab   Pain control   Outpatient f/u with NS for repeat XRs

## 2024-09-21 NOTE — PROGRESS NOTES
Progress Note - Hospitalist   Name: Joy White 89 y.o. female I MRN: [id number]  Unit/Bed#: -01 I Date of Admission: 9/19/2024   Date of Service: 9/21/2024 I Hospital Day: 2    Assessment & Plan  Generalized weakness  She has a generalized weakness and falls over the past at least 4 weeks at home.  She lives home alone but does have visiting Meals on Wheels and her daughter nearby.  Her daughter has wanted her to be evaluated and possibly go to rehab  Suspect her generalized weakness and recurrent falls are due to deconditioning.  She has nonfocal neurologic exam, no evidence of infection.  PT and OT evaluations --> recommend rehab  DC to rehab tomorrow    Hypoxia  She was hypoxic with ambulation in the ER  CTA chest without PE, pneumonia, effusion or volume overload  No clear etiology identified at this time, check ambulatory pulse ox with PT  Denies SOB today, SpO2 within normal range   Compression fracture of T7 vertebra (HCC)  Noted mild loss of height of T7 vertebrae, compression fracture  TLSO brace  PT and OT evals --> rehab   Pain control   Outpatient f/u with NS for repeat XRs  Hypertension  Her blood pressure was elevated at 163/94  Resume home regimen of amlodipine, lisinopril, bystolic  Monitor blood pressure  Depression  Mood is stable presently  Anemia, unspecified  Hx of anemia, hgb now WNL    VTE Pharmacologic Prophylaxis: VTE Score: 3 Moderate Risk (Score 3-4) - Pharmacological DVT Prophylaxis Ordered: enoxaparin (Lovenox).    Mobility:   Basic Mobility Inpatient Raw Score: 17  JH-HLM Goal: 5: Stand one or more mins  JH-HLM Achieved: 4: Move to chair/commode  JH-HLM Goal NOT achieved. Continue with multidisciplinary rounding and encourage appropriate mobility to improve upon JH-HLM goals.    Patient Centered Rounds: I performed bedside rounds with nursing staff today.   Discussions with Specialists or Other Care Team Provider: CM    Education and Discussions with Family / Patient: Attempted  to update  (daughter) via phone. Left voicemail.     Current Length of Stay: 2 day(s)  Current Patient Status: Inpatient   Certification Statement: The patient will continue to require additional inpatient hospital stay due to placement at rehab  Discharge Plan: Anticipate discharge tomorrow to rehab facility.    Code Status: Level 1 - Full Code    Subjective   Patient denies any pain or complaints. Looking forward to rehab tomorrow.     Objective     Vitals:   Temp (24hrs), Av.2 °F (36.8 °C), Min:98.2 °F (36.8 °C), Max:98.2 °F (36.8 °C)    Temp:  [98.2 °F (36.8 °C)] 98.2 °F (36.8 °C)  HR:  [56-61] 61  Resp:  [22] 22  BP: (145-151)/(72-76) 146/76  SpO2:  [90 %-97 %] 90 %  Body mass index is 29.76 kg/m².     Input and Output Summary (last 24 hours):     Intake/Output Summary (Last 24 hours) at 2024 1100  Last data filed at 2024 0842  Gross per 24 hour   Intake 1020 ml   Output 350 ml   Net 670 ml       Physical Exam  Vitals and nursing note reviewed.   Constitutional:       General: She is not in acute distress.     Appearance: She is not ill-appearing.   HENT:      Head: Normocephalic and atraumatic.      Nose: Nose normal.   Eyes:      General:         Right eye: No discharge.         Left eye: No discharge.      Extraocular Movements: Extraocular movements intact.      Conjunctiva/sclera: Conjunctivae normal.   Cardiovascular:      Rate and Rhythm: Normal rate and regular rhythm.      Heart sounds: Normal heart sounds. No murmur heard.  Pulmonary:      Effort: Pulmonary effort is normal. No respiratory distress.      Breath sounds: Normal breath sounds. No wheezing, rhonchi or rales.   Abdominal:      General: Bowel sounds are normal.      Palpations: Abdomen is soft.      Tenderness: There is no abdominal tenderness. There is no guarding.   Musculoskeletal:      Right lower leg: No edema.      Left lower leg: No edema.      Comments: T SLO brace in place   Skin:     General: Skin is  warm and dry.   Neurological:      General: No focal deficit present.      Mental Status: She is alert and oriented to person, place, and time.      Cranial Nerves: No cranial nerve deficit.   Psychiatric:         Mood and Affect: Mood normal.         Behavior: Behavior normal.          Lines/Drains:  Lines/Drains/Airways       Active Status       None                            Lab Results: I have reviewed the following results: CBC/BMP:   .     09/21/24  0252   WBC 7.42   HGB 13.4   HCT 42.7      SODIUM 138   K 4.2      CO2 27   BUN 10   CREATININE 0.46*   GLUC 103       Results from last 7 days   Lab Units 09/21/24  0252 09/20/24  0541   WBC Thousand/uL 7.42 7.15   HEMOGLOBIN g/dL 13.4 13.9   HEMATOCRIT % 42.7 45.1   PLATELETS Thousands/uL 210 204   SEGS PCT %  --  61   LYMPHO PCT %  --  24   MONO PCT %  --  13*   EOS PCT %  --  1     Results from last 7 days   Lab Units 09/21/24  0252 09/20/24  0541 09/19/24  1244   SODIUM mmol/L 138   < > 139   POTASSIUM mmol/L 4.2   < > 3.3*   CHLORIDE mmol/L 106   < > 102   CO2 mmol/L 27   < > 30   BUN mg/dL 10   < > 8   CREATININE mg/dL 0.46*   < > 0.52*   ANION GAP mmol/L 5   < > 7   CALCIUM mg/dL 8.3*   < > 8.9   ALBUMIN g/dL  --   --  3.7   TOTAL BILIRUBIN mg/dL  --   --  0.52   ALK PHOS U/L  --   --  75   ALT U/L  --   --  11   AST U/L  --   --  12*   GLUCOSE RANDOM mg/dL 103   < > 134    < > = values in this interval not displayed.                       Recent Cultures (last 7 days):         Imaging Review: No pertinent imaging studies reviewed.  Other Studies: No additional pertinent studies reviewed.    Last 24 Hours Medication List:     Current Facility-Administered Medications:     acetaminophen (TYLENOL) tablet 650 mg, Q6H PRN    amitriptyline (ELAVIL) tablet 50 mg, HS    amLODIPine (NORVASC) tablet 5 mg, Daily    calcium carbonate (TUMS) chewable tablet 500 mg, BID PRN    docusate sodium (COLACE) capsule 100 mg, BID PRN    dorzolamide (TRUSOPT)  ophthalmic solution 1 drop, BID    enoxaparin (LOVENOX) subcutaneous injection 40 mg, Daily    latanoprost (XALATAN) 0.005 % ophthalmic solution 1 drop, HS    lidocaine (LIDODERM) 5 % patch 1 patch, Daily    lisinopril (ZESTRIL) tablet 40 mg, Daily    meclizine (ANTIVERT) tablet 12.5 mg, TID PRN    melatonin tablet 6 mg, HS PRN    nebivolol (BYSTOLIC) tablet 10 mg, BID    ondansetron (ZOFRAN) injection 4 mg, Q4H PRN    oxybutynin (DITROPAN-XL) 24 hr tablet 5 mg, Daily    polyethylene glycol (MIRALAX) packet 17 g, Daily PRN    Administrative Statements   Today, Patient Was Seen By: Dian Fuentes PA-C  I have spent a total time of 35 minutes in caring for this patient on the day of the visit/encounter including Patient and family education, Counseling / Coordination of care, Documenting in the medical record, Reviewing / ordering tests, medicine, procedures  , and Communicating with other healthcare professionals .    **Please Note: This note may have been constructed using a voice recognition system.**

## 2024-09-22 ENCOUNTER — APPOINTMENT (INPATIENT)
Dept: RADIOLOGY | Facility: HOSPITAL | Age: 89
DRG: 552 | End: 2024-09-22
Payer: MEDICARE

## 2024-09-22 VITALS
HEART RATE: 55 BPM | WEIGHT: 142.4 LBS | OXYGEN SATURATION: 94 % | SYSTOLIC BLOOD PRESSURE: 121 MMHG | DIASTOLIC BLOOD PRESSURE: 65 MMHG | TEMPERATURE: 98 F | HEIGHT: 58 IN | RESPIRATION RATE: 22 BRPM | BODY MASS INDEX: 29.89 KG/M2

## 2024-09-22 PROBLEM — R09.02 HYPOXIA: Status: RESOLVED | Noted: 2024-09-19 | Resolved: 2024-09-22

## 2024-09-22 LAB
ANION GAP SERPL CALCULATED.3IONS-SCNC: 5 MMOL/L (ref 4–13)
BASOPHILS # BLD AUTO: 0.03 THOUSANDS/ΜL (ref 0–0.1)
BASOPHILS NFR BLD AUTO: 0 % (ref 0–1)
BUN SERPL-MCNC: 12 MG/DL (ref 5–25)
CALCIUM SERPL-MCNC: 8.5 MG/DL (ref 8.4–10.2)
CHLORIDE SERPL-SCNC: 104 MMOL/L (ref 96–108)
CO2 SERPL-SCNC: 31 MMOL/L (ref 21–32)
CREAT SERPL-MCNC: 0.55 MG/DL (ref 0.6–1.3)
EOSINOPHIL # BLD AUTO: 0.19 THOUSAND/ΜL (ref 0–0.61)
EOSINOPHIL NFR BLD AUTO: 3 % (ref 0–6)
ERYTHROCYTE [DISTWIDTH] IN BLOOD BY AUTOMATED COUNT: 12.4 % (ref 11.6–15.1)
GFR SERPL CREATININE-BSD FRML MDRD: 84 ML/MIN/1.73SQ M
GLUCOSE SERPL-MCNC: 104 MG/DL (ref 65–140)
HCT VFR BLD AUTO: 41.3 % (ref 34.8–46.1)
HGB BLD-MCNC: 13 G/DL (ref 11.5–15.4)
IMM GRANULOCYTES # BLD AUTO: 0.04 THOUSAND/UL (ref 0–0.2)
IMM GRANULOCYTES NFR BLD AUTO: 1 % (ref 0–2)
LYMPHOCYTES # BLD AUTO: 1.92 THOUSANDS/ΜL (ref 0.6–4.47)
LYMPHOCYTES NFR BLD AUTO: 28 % (ref 14–44)
MCH RBC QN AUTO: 30.6 PG (ref 26.8–34.3)
MCHC RBC AUTO-ENTMCNC: 31.5 G/DL (ref 31.4–37.4)
MCV RBC AUTO: 97 FL (ref 82–98)
MONOCYTES # BLD AUTO: 0.68 THOUSAND/ΜL (ref 0.17–1.22)
MONOCYTES NFR BLD AUTO: 10 % (ref 4–12)
NEUTROPHILS # BLD AUTO: 3.93 THOUSANDS/ΜL (ref 1.85–7.62)
NEUTS SEG NFR BLD AUTO: 58 % (ref 43–75)
NRBC BLD AUTO-RTO: 0 /100 WBCS
PLATELET # BLD AUTO: 215 THOUSANDS/UL (ref 149–390)
PMV BLD AUTO: 10.3 FL (ref 8.9–12.7)
POTASSIUM SERPL-SCNC: 3.9 MMOL/L (ref 3.5–5.3)
RBC # BLD AUTO: 4.25 MILLION/UL (ref 3.81–5.12)
SODIUM SERPL-SCNC: 140 MMOL/L (ref 135–147)
WBC # BLD AUTO: 6.79 THOUSAND/UL (ref 4.31–10.16)

## 2024-09-22 PROCEDURE — 99239 HOSP IP/OBS DSCHRG MGMT >30: CPT

## 2024-09-22 PROCEDURE — 80048 BASIC METABOLIC PNL TOTAL CA: CPT | Performed by: INTERNAL MEDICINE

## 2024-09-22 PROCEDURE — 72072 X-RAY EXAM THORAC SPINE 3VWS: CPT

## 2024-09-22 PROCEDURE — 85025 COMPLETE CBC W/AUTO DIFF WBC: CPT | Performed by: INTERNAL MEDICINE

## 2024-09-22 RX ORDER — POLYETHYLENE GLYCOL 3350 17 G/17G
17 POWDER, FOR SOLUTION ORAL DAILY
Qty: 170 G | Refills: 0
Start: 2024-09-23 | End: 2024-10-03

## 2024-09-22 RX ORDER — LIDOCAINE 50 MG/G
1 PATCH TOPICAL DAILY
Qty: 10 PATCH | Refills: 0
Start: 2024-09-22 | End: 2024-10-02

## 2024-09-22 RX ORDER — DOCUSATE SODIUM 100 MG/1
100 CAPSULE, LIQUID FILLED ORAL 2 TIMES DAILY
Qty: 20 CAPSULE | Refills: 0
Start: 2024-09-22 | End: 2024-10-02

## 2024-09-22 RX ADMIN — POLYETHYLENE GLYCOL 3350 17 G: 17 POWDER, FOR SOLUTION ORAL at 08:54

## 2024-09-22 RX ADMIN — AMLODIPINE BESYLATE 5 MG: 5 TABLET ORAL at 08:54

## 2024-09-22 RX ADMIN — DORZOLAMIDE HCL 1 DROP: 20 SOLUTION/ DROPS OPHTHALMIC at 08:54

## 2024-09-22 RX ADMIN — ENOXAPARIN SODIUM 40 MG: 40 INJECTION SUBCUTANEOUS at 08:54

## 2024-09-22 RX ADMIN — NEBIVOLOL 10 MG: 5 TABLET ORAL at 08:54

## 2024-09-22 RX ADMIN — DOCUSATE SODIUM 100 MG: 100 CAPSULE, LIQUID FILLED ORAL at 08:54

## 2024-09-22 RX ADMIN — LISINOPRIL 40 MG: 20 TABLET ORAL at 08:54

## 2024-09-22 RX ADMIN — OXYBUTYNIN CHLORIDE 5 MG: 5 TABLET, EXTENDED RELEASE ORAL at 08:54

## 2024-09-22 NOTE — DISCHARGE SUMMARY
Discharge Summary - Hospitalist   Name: Joy White 89 y.o. female I MRN: [id number]  Unit/Bed#: -01 I Date of Admission: 9/19/2024   Date of Service: 9/22/2024 I Hospital Day: 3     Assessment & Plan  Generalized weakness  She has a generalized weakness and falls over the past at least 4 weeks at home.  She lives home alone but does have visiting Meals on Wheels and her daughter nearby.  Her daughter has wanted her to be evaluated and possibly go to rehab  Suspect her generalized weakness and recurrent falls are due to deconditioning.  She has nonfocal neurologic exam, no evidence of infection.  PT and OT evaluations --> recommend rehab, discharge today to [facility] (Resolved: 9/22/2024)  She was hypoxic with ambulation in the ER  CTA chest without PE, pneumonia, effusion or volume overload  No clear etiology identified at this time, check ambulatory pulse ox with PT  Denies SOB today, SpO2 within normal range   resolved  Compression fracture of T7 vertebra (HCC)  Noted mild loss of height of T7 vertebrae, compression fracture  TLSO brace  PT and OT evals --> rehab   Pain control   Baseline Upright XR's completed, no new loss of height or displacement  Outpatient f/u with NS for repeat XRs  Hypertension  Her blood pressure was elevated at 163/94  Resume home regimen of amlodipine, lisinopril, bystolic  Monitor blood pressure  Depression  Mood is stable presently  Anemia, unspecified  Hx of anemia, hgb now WNL     Medical Problems       Resolved Problems  Date Reviewed: 9/21/2024            Resolved    Hypoxia 9/22/2024     Resolved by  Dian Fuentes PA-C        Discharging Physician / Practitioner: Dian Fuentes PA-C  PCP: Radha Cabrera DO  Admission Date:   Admission Orders (From admission, onward)       Ordered        09/19/24 1558  INPATIENT ADMISSION  Once                          Discharge Date: 09/22/24    Consultations During Hospital Stay:  None      Procedures Performed:   None     Significant Findings / Test Results:   XR spine thoracic 3 vw   Final Result by Corby Sams MD (09/22 1034)      No interval progression of age indeterminant mild anterior wedging deformity at T7. No new fractures.         Computerized Assisted Algorithm (CAA) may have been used to analyze all applicable images.         Workstation performed: [id number]         CT recon only thoracolumbar   Final Result by Jamie Vergara MD (09/19 1545)      Age-indeterminate mild loss of height of the T7 vertebral body. Suggestion of mild prevertebral edema in this region should be correlated with focal point tenderness for the possibility of an acute fracture and if warranted MRI can be performed for    further evaluation.               Workstation performed: [id number]         CTA chest (pe study) abdomen pelvis contrast   Final Result by Jamie Vergara MD (09/19 1545)      No evidence for pulmonary embolism. No focal airspace consolidation.      Cholelithiasis without evidence for acute cholecystitis      Fat-containing right ventral abdominal wall hernias.      Correlate with separate CT of the thoracolumbar spine for additional findings.         Workstation performed: [id number]               Incidental Findings:   None      Test Results Pending at Discharge (will require follow up):   None      Outpatient Tests Requested:  Repeat upright XR spine in 2 weeks     Complications:  None     Reason for Admission: Weakness, falls     Hospital Course:   Joy White is a 89 y.o. female patient with PMH of hypertension, anemia, depression who originally presented to the hospital on 9/19/2024 due to generalized weakness at home with multiple falls.  Upon admission, patient was found to have suspected acute T7 compression fracture, no additional injuries.  Patient was given T SLO brace and assessed by PT/OT.  Notably patient's pain was very well-controlled, her primary complaint was difficulty walking.   "PT/OT recommended postacute rehab.  Patient will be discharged to [facility] [place].  While hospitalized, patient did not require significant pain control, labs/vitals remained stable.  Prior to discharge, patient is without any complaints, looking forward to rehab.  Upright x-rays were obtained with T SLO brace in place, she should follow-up with outpatient neurosurgery in 2 weeks for continued surveillance/repeat x-rays.    Please see above list of diagnoses and related plan for additional information.     Condition at Discharge: good    Discharge Day Visit / Exam:   Subjective: Patient is feeling well, denies any pain or complaints at this time.  Looking forward to going to rehab.  Vitals: Blood Pressure: 121/65 (09/22/24 0700)  Pulse: 55 (09/22/24 0700)  Temperature: 98 °F (36.7 °C) (09/22/24 0700)  Temp Source: Oral (09/22/24 0700)  Respirations: 22 (09/22/24 0700)  Height: 4' 10\" (147.3 cm) (09/19/24 1750)  Weight - Scale: 64.6 kg (142 lb 6.4 oz) (09/19/24 1750)  SpO2: 96 % (09/22/24 0700)  Exam:   Physical Exam  Vitals and nursing note reviewed.   Constitutional:       General: She is not in acute distress.     Appearance: She is not ill-appearing.   HENT:      Head: Normocephalic and atraumatic.      Nose: Nose normal.   Eyes:      General:         Right eye: No discharge.         Left eye: No discharge.      Extraocular Movements: Extraocular movements intact.      Conjunctiva/sclera: Conjunctivae normal.   Cardiovascular:      Rate and Rhythm: Normal rate and regular rhythm.      Heart sounds: Normal heart sounds. No murmur heard.  Pulmonary:      Effort: Pulmonary effort is normal. No respiratory distress.      Breath sounds: Normal breath sounds. No wheezing, rhonchi or rales.   Abdominal:      General: Bowel sounds are normal.      Palpations: Abdomen is soft.      Tenderness: There is no abdominal tenderness. There is no guarding.   Musculoskeletal:      Right lower leg: No edema.      Left lower leg: " No edema.   Skin:     General: Skin is warm and dry.   Neurological:      General: No focal deficit present.      Mental Status: She is alert and oriented to person, place, and time. Mental status is at baseline.      Cranial Nerves: No cranial nerve deficit.   Psychiatric:         Mood and Affect: Mood normal.         Behavior: Behavior normal.          Discussion with Family: Updated  (daughter) via phone.    Discharge instructions/Information to patient and family:   See after visit summary for information provided to patient and family.      Provisions for Follow-Up Care:  See after visit summary for information related to follow-up care and any pertinent home health orders.      Mobility at time of Discharge:   Basic Mobility Inpatient Raw Score: 17  JH-HLM Goal: 5: Stand one or more mins  JH-HLM Achieved: 6: Walk 10 steps or more  HLM Goal achieved. Continue to encourage appropriate mobility.     Disposition:   Acute Rehab at [facility]     Planned Readmission: None     Discharge Medications:  See after visit summary for reconciled discharge medications provided to patient and/or family.      Administrative Statements   Discharge Statement:  I have spent a total time of 40 minutes in caring for this patient on the day of the visit/encounter. >30 minutes of time was spent on: Risks and benefits of tx options, Instructions for management, Patient and family education, Counseling / Coordination of care, Documenting in the medical record, and Communicating with other healthcare professionals .    **Please Note: This note may have been constructed using a voice recognition system**

## 2024-09-22 NOTE — ASSESSMENT & PLAN NOTE
She was hypoxic with ambulation in the ER  CTA chest without PE, pneumonia, effusion or volume overload  No clear etiology identified at this time, check ambulatory pulse ox with PT  Denies SOB today, SpO2 within normal range   resolved

## 2024-09-22 NOTE — DISCHARGE INSTR - AVS FIRST PAGE
Follow-up providers:  Please follow-up with PCP within 1 week of discharge  Please follow-up with outpatient neurosurgery within 2 weeks of discharge for repeat x-rays of thoracic spine    Medications:  Please continue all home medications  Please continue Tylenol and lidocaine patches as needed for pain management      Please use TSLO brace for T7 compression fracture when out of bed or when sitting at angle greater than 45 degrees.  If patient is not experiencing any pain and the brace is more uncomfortable than not wearing brace, may brace as needed

## 2024-09-22 NOTE — ASSESSMENT & PLAN NOTE
Noted mild loss of height of T7 vertebrae, compression fracture  TLSO brace  PT and OT evals --> rehab   Pain control   Baseline Upright XR's completed, no new loss of height or displacement  Outpatient f/u with NS for repeat XRs

## 2024-09-22 NOTE — ASSESSMENT & PLAN NOTE
She has a generalized weakness and falls over the past at least 4 weeks at home.  She lives home alone but does have visiting Meals on Wheels and her daughter nearby.  Her daughter has wanted her to be evaluated and possibly go to rehab  Suspect her generalized weakness and recurrent falls are due to deconditioning.  She has nonfocal neurologic exam, no evidence of infection.  PT and OT evaluations --> recommend rehab, discharge today to [facility]

## 2024-09-24 NOTE — CASE MANAGEMENT
Case Management Progress Note    Patient name Joy White  Location /-01 MRN [id number]  : 1935 Date 2024       LOS (days): 3  Geometric Mean LOS (GMLOS) (days): 2.9  Days to GMLOS:0        OBJECTIVE:        Current admission status: Inpatient  Preferred Pharmacy:   [facility]/pharmacy #[id number] - [address] [address]  Phone: [phone] Fax: [phone]    Primary Care Provider: Radha Cabrera DO    Primary Insurance: MEDICARE  Secondary Insurance: [facility]    PROGRESS NOTE:    Notification made to OP CM Handoff: [facility] OP CM regarding discharge planning and disposition.   Spoke to Tierra ROWE.

## 2024-10-03 ENCOUNTER — OFFICE VISIT (OUTPATIENT)
Age: 89
End: 2024-10-03
Payer: MEDICARE

## 2024-10-03 VITALS
TEMPERATURE: 99 F | HEART RATE: 65 BPM | DIASTOLIC BLOOD PRESSURE: 86 MMHG | HEIGHT: 58 IN | BODY MASS INDEX: 29.76 KG/M2 | OXYGEN SATURATION: 95 % | SYSTOLIC BLOOD PRESSURE: 148 MMHG

## 2024-10-03 DIAGNOSIS — S22.060A COMPRESSION FRACTURE OF T7 VERTEBRA (HCC): Primary | ICD-10-CM

## 2024-10-03 DIAGNOSIS — S22.000A THORACIC COMPRESSION FRACTURE (HCC): ICD-10-CM

## 2024-10-03 PROCEDURE — 99204 OFFICE O/P NEW MOD 45 MIN: CPT | Performed by: NURSE PRACTITIONER

## 2024-10-03 RX ORDER — ONDANSETRON 4 MG/1
4 TABLET, FILM COATED ORAL EVERY 8 HOURS PRN
COMMUNITY

## 2024-10-03 RX ORDER — BISACODYL 10 MG
10 SUPPOSITORY, RECTAL RECTAL DAILY
COMMUNITY

## 2024-10-03 RX ORDER — ACETAMINOPHEN 325 MG/1
650 TABLET ORAL EVERY 4 HOURS PRN
COMMUNITY

## 2024-10-03 RX ORDER — DIBASIC SODIUM PHOSPHATE, MONOBASIC SODIUM PHOSPHATE 7; 19 G/118ML; G/118ML
ENEMA RECTAL
COMMUNITY

## 2024-10-03 NOTE — ASSESSMENT & PLAN NOTE
Patient seen in outpatient office today as referral by the hospital for further workup and evaluation of age-indeterminate T7 compression fracture  Patient originally presented to the ED on 9/19/2024 for further workup and evaluation of generalized weakness and thoracic spine tenderness.  She admitted to having a fall 3-4 days prior to hospitalization.  Imaging demonstrated a T7 compression fracture for which she was placed in a TLSO brace and had upright x-rays.  Patient denies any back pain but is compliant with her back brace.  On exam she has thoracic midline TTP    Imaging:    CT thoracolumbar spine 9/19/2024:Age-indeterminate mild loss of height of the T7 vertebral body. Suggestion of mild prevertebral edema in this region should be correlated with focal point tenderness for the possibility of an acute fracture and if warranted MRI can be performed for   further evaluation.    Thoracic spine x-ray 9/22/2024:No interval progression of age indeterminant mild anterior wedging deformity at T7. No new fractures.    Plan:  Reviewed prior imaging with patient and daughter in detail.  No neurosurgical intervention warranted, recommend ongoing conservative management with brace and pain control  Pain control with OTC and prescribed medication as needed for pain  Continue to wear TLSO brace when out of bed or head of bed greater than 45 degrees  Continue thoracic spine precautions for any further injury  Recommend patient obtain an updated x-ray today, unfortunately patient was brought in from her facility by her daughter who is unable to take her for x-rays today.  Then spoke about getting x-rays in 2 weeks follow-up again patient daughter is unable to have her x-rays done and will likely follow-up in 3-4 weeks with repeat x-rays.  Will contact the facility to see if they can get x-rays done in a timely manner  Patient will follow-up once x-rays completed or sooner symptoms worsen  Patient been aware to seek care  sooner if she develops any new or worsening neurological change or red flag signs  Patient made aware to contact neurosurgery with any further questions or concerns.

## 2024-10-03 NOTE — PROGRESS NOTES
Ambulatory Visit  Name: Joy White      : 1935      MRN: [id number]  Encounter Provider: JIGAR Oliva  Encounter Date: 10/3/2024   Encounter department: [facility] [place]    Assessment & Plan  Thoracic compression fracture (HCC)    Orders:    Ambulatory Referral to Neurosurgery    X-ray thoracic spine 2 views; Future    Compression fracture of T7 vertebra (HCC)  Patient seen in outpatient office today as referral by the hospital for further workup and evaluation of age-indeterminate T7 compression fracture  Patient originally presented to the ED on 2024 for further workup and evaluation of generalized weakness and thoracic spine tenderness.  She admitted to having a fall 3-4 days prior to hospitalization.  Imaging demonstrated a T7 compression fracture for which she was placed in a TLSO brace and had upright x-rays.  Patient denies any back pain but is compliant with her back brace.  On exam she has thoracic midline TTP    Imaging:    CT thoracolumbar spine 2024:Age-indeterminate mild loss of height of the T7 vertebral body. Suggestion of mild prevertebral edema in this region should be correlated with focal point tenderness for the possibility of an acute fracture and if warranted MRI can be performed for   further evaluation.    Thoracic spine x-ray 2024:No interval progression of age indeterminant mild anterior wedging deformity at T7. No new fractures.    Plan:  Reviewed prior imaging with patient and daughter in detail.  No neurosurgical intervention warranted, recommend ongoing conservative management with brace and pain control  Pain control with OTC and prescribed medication as needed for pain  Continue to wear TLSO brace when out of bed or head of bed greater than 45 degrees  Continue thoracic spine precautions for any further injury  Recommend patient obtain an updated x-ray today, unfortunately patient was brought in from her facility  by her daughter who is unable to take her for x-rays today.  Then spoke about getting x-rays in 2 weeks follow-up again patient daughter is unable to have her x-rays done and will likely follow-up in 3-4 weeks with repeat x-rays.  Will contact the facility to see if they can get x-rays done in a timely manner  Patient will follow-up once x-rays completed or sooner symptoms worsen  Patient been aware to seek care sooner if she develops any new or worsening neurological change or red flag signs  Patient made aware to contact neurosurgery with any further questions or concerns.           Joy White 88yo female with past medical history significant for colitis, hypertension, depression, and anemia.  Patient seen in outpatient office today as a new consult referred by the hospital for further workup and evaluation of age-indeterminate T7 compression fracture.    Patient originally presented to the ED on 9/19/2024 for further workup and evaluation of generalized weakness and thoracic tenderness.  She admitted to a fall 3-4 days prior to hospitalization.  Imaging demonstrated T7 compression fracture, she was placed in a TLSO brace.    Patient was living at home alone and having [employer] deliver food delivered to her house with her daughter living nearby.  She is currently at a rehab facility.  Patient and daughter both states she has never had back pain since the fall.  She continues to wear TLSO brace as instructed.  She is able to ambulate with a walker and states her balance is okay.  She denies any recent falls or traumas.  She continues to complain of leg weakness at times over the past year. Ongoing nausea as well.  She offers no other complaints.    No new imaging to review.  Patient does have thoracic spine midline TTP on exam.  Recommend patient obtain updated thoracic spine x-rays today.  Patient was brought in by her daughter and son-in-law, her daughter states she is unable to take her for x-rays  "today.  Wanted her to follow-up then in 2 weeks with repeat x-rays, daughter states it is her granddaughter wedding and there is a lot to do.  Daughter states she can bring her back in 3-4 weeks with repeat x-rays.  Will try and contact facility to see if they can get x-rays done sooner    HPI  Review of Systems   Constitutional: Negative.    HENT: Negative.     Eyes: Negative.    Respiratory: Negative.     Cardiovascular: Negative.    Gastrointestinal:  Positive for nausea.   Endocrine: Negative.    Genitourinary: Negative.    Musculoskeletal:  Positive for gait problem.        Patient and daughter came to appt confused on who scheduled the appt and why      Patient denies n/t/lbp/w  Ambulates with walker, presents today in wheelchair; wearing brace   Skin: Negative.    Allergic/Immunologic: Negative.    Hematological: Negative.    Psychiatric/Behavioral: Negative.       I have personally reviewed the MA's review of systems and made changes as necessary.      /86 (BP Location: Left arm, Patient Position: Sitting, Cuff Size: Adult)   Pulse 65   Temp 99 °F (37.2 °C) (Temporal)   Ht 4' 10\" (1.473 m)   LMP  (LMP Unknown)   SpO2 95%   BMI 29.76 kg/m²     Physical Exam  Vitals reviewed.   Constitutional:       General: She is awake.      Appearance: Normal appearance.      Comments: TLSO brace in place   HENT:      Head: Normocephalic and atraumatic.   Eyes:      Extraocular Movements: Extraocular movements intact and EOM normal.      Conjunctiva/sclera: Conjunctivae normal.      Comments: R pupil 5mm and non reactive  L 3mm and reactive  R eyelid drooping    Cardiovascular:      Rate and Rhythm: Normal rate.   Pulmonary:      Effort: Pulmonary effort is normal. No respiratory distress.   Chest:      Chest wall: No tenderness.   Abdominal:      General: There is no distension.      Palpations: Abdomen is soft.      Tenderness: There is no abdominal tenderness.   Musculoskeletal:         General: Normal range " of motion.      Cervical back: Normal range of motion and neck supple. No tenderness.      Thoracic back: Tenderness present.      Lumbar back: No tenderness.   Skin:     General: Skin is warm and dry.   Neurological:      Mental Status: She is alert and oriented to person, place, and time.      Deep Tendon Reflexes:      Reflex Scores:       Bicep reflexes are 1+ on the right side and 1+ on the left side.       Patellar reflexes are 1+ on the right side and 1+ on the left side.  Psychiatric:         Attention and Perception: Attention and perception normal.         Mood and Affect: Mood and affect normal.         Speech: Speech normal.         Behavior: Behavior normal. Behavior is cooperative.         Thought Content: Thought content normal.         Cognition and Memory: Cognition and memory normal.         Judgment: Judgment normal.       Neurologic Exam     Mental Status   Oriented to person, place, and time.   Follows 2 step commands.   Attention: normal. Concentration: normal.   Speech: speech is normal   Level of consciousness: alert  Knowledge: good.     Cranial Nerves     CN III, IV, VI   Extraocular motions are normal.   Conjugate gaze: present    CN V   Facial sensation intact.     CN VII   Facial expression full, symmetric.     CN VIII   Hearing: impaired    CN IX, X   CN IX normal.     CN XI   CN XI normal.     CN XII   CN XII normal.     Motor Exam   Muscle bulk: normal  Overall muscle tone: normalStrength 4/5 throughout      Sensory Exam   Light touch normal.     Gait, Coordination, and Reflexes     Gait  Gait: (presented in wheel chair)    Reflexes   Right biceps: 1+  Left biceps: 1+  Right patellar: 1+  Left patellar: 1+  Right Frances: absent  Left Frances: absent  Right ankle clonus: absent  Left ankle clonus: absent      I have reviewed the following images/report studies in PACS: No results found.

## 2024-10-03 NOTE — PATIENT INSTRUCTIONS
Neurosurgery discharge instructions following spine fracture:     TLSO brace to be worn when out of bed of head of bed greater than 45 degrees.  May place brace on while sitting on edge of bed. May be removed for showering.   No bending, twisting or heavy lifting. No strenuous activities. No Driving.   Follow-up as scheduled in two-3 weeks with repeat spine x-rays to be completed 2-3 days prior to visit.  Prescription has been entered electronically.      **Please notify MD immediately if you have increased back or leg pain. New numbness, tingling, weakness in your legs and/or bowel/bladder incontinence**

## 2024-11-08 ENCOUNTER — OFFICE VISIT (OUTPATIENT)
Age: 89
End: 2024-11-08
Payer: MEDICARE

## 2024-11-08 VITALS
SYSTOLIC BLOOD PRESSURE: 128 MMHG | WEIGHT: 142 LBS | RESPIRATION RATE: 20 BRPM | HEART RATE: 56 BPM | OXYGEN SATURATION: 96 % | TEMPERATURE: 98 F | DIASTOLIC BLOOD PRESSURE: 78 MMHG | BODY MASS INDEX: 29.81 KG/M2 | HEIGHT: 58 IN

## 2024-11-08 DIAGNOSIS — S22.060A COMPRESSION FRACTURE OF T7 VERTEBRA (HCC): Primary | ICD-10-CM

## 2024-11-08 PROCEDURE — 99213 OFFICE O/P EST LOW 20 MIN: CPT | Performed by: NURSE PRACTITIONER

## 2024-11-08 NOTE — PROGRESS NOTES
Ambulatory Visit  Name: Joy White      : 1935      MRN: [id number]  Encounter Provider: JIGAR Grace  Encounter Date: 2024   Encounter department: [facility] [place]    Assessment & Plan  Compression fracture of T7 vertebra (HCC)  Presents for follow up for T7 fracture  Discovered incidentally while hospitalized for ambulatory dysfunction/falls 24.  Managed in TLSO brace - has not been wearing as she does not have any pain.  Exam non-focal. Ambulates with walker at baseline.    Imaging:  Thoracic x-rays, 2024: 45% anterior wedge compression fracture to mid-thoracic spine.    Plan:  Reviewed imaging with patient and her son.  Discussed that unclear chronicity of fracture.  As she has never had any pain to area and has not been wearing brace, does not need any further treatment or surveillance.  Follow up as needed.           History of Present Illness   89-year-old female with past medical history of colitis, hypertension, who presents for follow-up evaluation for T7 fracture.  This was discovered incidentally in September of this year when she was hospitalized after a fall.  She continues with some ambulatory dysfunction.  She ambulates with a walker.  When she was evaluated in the hospital she was noted with the above finding of unknown chronicity.  She was fitted with TLSO brace and initially evaluated by our service outpatient on 10/3.    She relates that she has never had any back pain even after her fall.  She has not been wearing her brace as she cannot fit her winter coat over it and also she does not have any pain so she feels she does not need to wear it.  She denies any bowel or bladder issues besides her chronic urinary incontinence.  She is accompanied today by her son.      Review of Systems   Constitutional: Negative.    HENT: Negative.     Eyes: Negative.    Respiratory: Negative.     Cardiovascular: Negative.    Gastrointestinal:   "Negative for nausea.   Endocrine: Negative.    Genitourinary: Negative.    Musculoskeletal:  Positive for gait problem (unsteady gate, very anxious not to fall).        Patient and daughter came to appt confused on who scheduled the appt and why      Patient denies n/t/lbp/w  Ambulates with walker, presents today in wheelchair; wearing brace   Skin: Negative.    Allergic/Immunologic: Negative.    Neurological:  Positive for weakness (legs).   Hematological: Negative.    Psychiatric/Behavioral: Negative.       I have personally reviewed the MA's review of systems and made changes as necessary.    Pertinent Medical History         Objective     /78 (BP Location: Right arm, Patient Position: Sitting, Cuff Size: Large)   Pulse 56   Temp 98 °F (36.7 °C) (Temporal)   Resp 20   Ht 4' 10\" (1.473 m)   Wt 64.4 kg (142 lb)   LMP  (LMP Unknown)   SpO2 96%   BMI 29.68 kg/m²     Physical Exam  Constitutional:       Appearance: She is well-developed.   HENT:      Head: Normocephalic and atraumatic.   Eyes:      Extraocular Movements: EOM normal.      Pupils: Pupils are equal, round, and reactive to light.   Pulmonary:      Effort: Pulmonary effort is normal.   Abdominal:      Palpations: Abdomen is soft.   Musculoskeletal:         General: Normal range of motion.      Cervical back: Normal range of motion and neck supple.   Skin:     General: Skin is warm and dry.   Neurological:      Mental Status: She is alert and oriented to person, place, and time.      Motor: Motor strength is normal.     Coordination: Finger-Nose-Finger Test normal.      Gait: Gait is intact.   Psychiatric:         Speech: Speech normal.       Neurologic Exam     Mental Status   Oriented to person, place, and time.   Oriented to person.   Oriented to place.   Oriented to time.   Registration: recalls 3 of 3 objects. Follows 1 step commands.   Attention: normal. Concentration: normal.   Speech: speech is normal   Level of consciousness: " alert  Knowledge: good and consistent with education. Able to perform simple calculations.   Able to name object.     Cranial Nerves     CN II   Visual acuity: normal  Right visual field deficit: none  Left visual field deficit: none     CN III, IV, VI   Pupils are equal, round, and reactive to light.  Extraocular motions are normal.   Right pupil: Size: 3 mm. Shape: regular. Reactivity: brisk. Consensual response: intact. Accommodation: intact.   Left pupil: Size: 3 mm. Shape: regular. Reactivity: brisk. Consensual response: intact. Accommodation: intact.   CN III: no CN III palsy  CN VI: no CN VI palsy  Nystagmus: none   Conjugate gaze: present    CN V   Right facial sensation deficit: none  Left facial sensation deficit: none    CN VII   Right facial weakness: none  Left facial weakness: none    CN VIII   Hearing: intact    CN IX, X   Palate: symmetric    CN XI   Right sternocleidomastoid strength: normal  Left sternocleidomastoid strength: normal  Right trapezius strength: normal  Left trapezius strength: normal    CN XII   Tongue: not atrophic  Fasciculations: absent  Tongue deviation: none    Motor Exam   Muscle bulk: normal  Overall muscle tone: normal    Strength   Strength 5/5 throughout.     Sensory Exam   Light touch normal.     Gait, Coordination, and Reflexes     Gait  Gait: normal    Coordination   Finger to nose coordination: normal    Tremor   Resting tremor: absent  Intention tremor: absent  Action tremor: absent    Reflexes   Reflexes 2+ except as noted.   Right Frances: absent  Left Frances: absent  Right ankle clonus: absent  Left ankle clonus: absent      I personally reviewed the following image studies in PACS and associated radiology reports: xray(s). My interpretation of the radiology images/reports is: as above.    Administrative Statements   I have spent a total time of 30 minutes in caring for this patient on the day of the visit/encounter including Diagnostic results, Risks and benefits  of tx options, Instructions for management, Patient and family education, Importance of tx compliance, Risk factor reductions, Impressions, Counseling / Coordination of care, Documenting in the medical record, Reviewing / ordering tests, medicine, procedures  , and Obtaining or reviewing history  .

## 2024-11-08 NOTE — ASSESSMENT & PLAN NOTE
Presents for follow up for T7 fracture  Discovered incidentally while hospitalized for ambulatory dysfunction/falls 9/19/24.  Managed in TLSO brace - has not been wearing as she does not have any pain.  Exam non-focal. Ambulates with walker at baseline.    Imaging:  Thoracic x-rays, 11/4/2024: 45% anterior wedge compression fracture to mid-thoracic spine.    Plan:  Reviewed imaging with patient and her son.  Discussed that unclear chronicity of fracture.  As she has never had any pain to area and has not been wearing brace, does not need any further treatment or surveillance.  Follow up as needed.

## 2025-04-09 ENCOUNTER — APPOINTMENT (EMERGENCY)
Dept: CT IMAGING | Facility: HOSPITAL | Age: 89
End: 2025-04-09
Payer: MEDICARE

## 2025-04-09 ENCOUNTER — HOSPITAL ENCOUNTER (EMERGENCY)
Facility: HOSPITAL | Age: 89
Discharge: HOME | End: 2025-04-09
Attending: EMERGENCY MEDICINE
Payer: MEDICARE

## 2025-04-09 ENCOUNTER — APPOINTMENT (EMERGENCY)
Dept: RADIOLOGY | Facility: HOSPITAL | Age: 89
End: 2025-04-09
Payer: MEDICARE

## 2025-04-09 VITALS
OXYGEN SATURATION: 96 % | RESPIRATION RATE: 18 BRPM | SYSTOLIC BLOOD PRESSURE: 201 MMHG | HEART RATE: 88 BPM | TEMPERATURE: 98.5 F | DIASTOLIC BLOOD PRESSURE: 91 MMHG

## 2025-04-09 DIAGNOSIS — S01.91XA LACERATION OF HEAD: ICD-10-CM

## 2025-04-09 DIAGNOSIS — S09.90XA INJURY OF HEAD, INITIAL ENCOUNTER: Primary | ICD-10-CM

## 2025-04-09 LAB
ABO GROUP BLD: NORMAL
ALBUMIN SERPL BCG-MCNC: 3.9 G/DL (ref 3.5–5)
ALP SERPL-CCNC: 70 U/L (ref 34–104)
ALT SERPL W P-5'-P-CCNC: 11 U/L (ref 7–52)
ANION GAP SERPL CALCULATED.3IONS-SCNC: 6 MMOL/L (ref 4–13)
AST SERPL W P-5'-P-CCNC: 14 U/L (ref 13–39)
BASOPHILS # BLD AUTO: 0.03 THOUSANDS/ÂΜL (ref 0–0.1)
BASOPHILS NFR BLD AUTO: 0 % (ref 0–1)
BILIRUB SERPL-MCNC: 0.55 MG/DL (ref 0.2–1)
BLD GP AB SCN SERPL QL: NEGATIVE
BUN SERPL-MCNC: 14 MG/DL (ref 5–25)
CALCIUM SERPL-MCNC: 9.1 MG/DL (ref 8.4–10.2)
CHLORIDE SERPL-SCNC: 103 MMOL/L (ref 96–108)
CO2 SERPL-SCNC: 32 MMOL/L (ref 21–32)
CREAT SERPL-MCNC: 0.47 MG/DL (ref 0.6–1.3)
EOSINOPHIL # BLD AUTO: 0.02 THOUSAND/ÂΜL (ref 0–0.61)
EOSINOPHIL NFR BLD AUTO: 0 % (ref 0–6)
ERYTHROCYTE [DISTWIDTH] IN BLOOD BY AUTOMATED COUNT: 12.6 % (ref 11.6–15.1)
GFR SERPL CREATININE-BSD FRML MDRD: 88 ML/MIN/1.73SQ M
GLUCOSE SERPL-MCNC: 108 MG/DL (ref 65–140)
HCT VFR BLD AUTO: 43.4 % (ref 34.8–46.1)
HGB BLD-MCNC: 13.5 G/DL (ref 11.5–15.4)
IMM GRANULOCYTES # BLD AUTO: 0.03 THOUSAND/UL (ref 0–0.2)
IMM GRANULOCYTES NFR BLD AUTO: 0 % (ref 0–2)
LYMPHOCYTES # BLD AUTO: 1.53 THOUSANDS/ÂΜL (ref 0.6–4.47)
LYMPHOCYTES NFR BLD AUTO: 21 % (ref 14–44)
MCH RBC QN AUTO: 29.8 PG (ref 26.8–34.3)
MCHC RBC AUTO-ENTMCNC: 31.1 G/DL (ref 31.4–37.4)
MCV RBC AUTO: 96 FL (ref 82–98)
MONOCYTES # BLD AUTO: 0.63 THOUSAND/ÂΜL (ref 0.17–1.22)
MONOCYTES NFR BLD AUTO: 9 % (ref 4–12)
NEUTROPHILS # BLD AUTO: 5.03 THOUSANDS/ÂΜL (ref 1.85–7.62)
NEUTS SEG NFR BLD AUTO: 70 % (ref 43–75)
NRBC BLD AUTO-RTO: 0 /100 WBCS
PLATELET # BLD AUTO: 214 THOUSANDS/UL (ref 149–390)
PMV BLD AUTO: 9.4 FL (ref 8.9–12.7)
POTASSIUM SERPL-SCNC: 3.6 MMOL/L (ref 3.5–5.3)
PROT SERPL-MCNC: 6.5 G/DL (ref 6.4–8.4)
RBC # BLD AUTO: 4.53 MILLION/UL (ref 3.81–5.12)
RH BLD: POSITIVE
SODIUM SERPL-SCNC: 141 MMOL/L (ref 135–147)
SPECIMEN EXPIRATION DATE: NORMAL
WBC # BLD AUTO: 7.27 THOUSAND/UL (ref 4.31–10.16)

## 2025-04-09 PROCEDURE — 86900 BLOOD TYPING SEROLOGIC ABO: CPT

## 2025-04-09 PROCEDURE — 70450 CT HEAD/BRAIN W/O DYE: CPT

## 2025-04-09 PROCEDURE — 99285 EMERGENCY DEPT VISIT HI MDM: CPT

## 2025-04-09 PROCEDURE — 90715 TDAP VACCINE 7 YRS/> IM: CPT

## 2025-04-09 PROCEDURE — 71045 X-RAY EXAM CHEST 1 VIEW: CPT

## 2025-04-09 PROCEDURE — 80053 COMPREHEN METABOLIC PANEL: CPT

## 2025-04-09 PROCEDURE — 90471 IMMUNIZATION ADMIN: CPT

## 2025-04-09 PROCEDURE — 72125 CT NECK SPINE W/O DYE: CPT

## 2025-04-09 PROCEDURE — 86850 RBC ANTIBODY SCREEN: CPT

## 2025-04-09 PROCEDURE — 86901 BLOOD TYPING SEROLOGIC RH(D): CPT

## 2025-04-09 PROCEDURE — 85025 COMPLETE CBC W/AUTO DIFF WBC: CPT

## 2025-04-09 PROCEDURE — 93005 ELECTROCARDIOGRAM TRACING: CPT

## 2025-04-09 PROCEDURE — 36415 COLL VENOUS BLD VENIPUNCTURE: CPT

## 2025-04-09 RX ADMIN — TETANUS TOXOID, REDUCED DIPHTHERIA TOXOID AND ACELLULAR PERTUSSIS VACCINE, ADSORBED 0.5 ML: 5; 2.5; 8; 8; 2.5 SUSPENSION INTRAMUSCULAR at 15:17

## 2025-04-09 NOTE — DISCHARGE INSTRUCTIONS
Please try not to get the area wet for approximately 24 hours.  When cleaning the area after that you can use soap and water just please do not scrub it clean and do not scrub it dry.  Please pat while drying.  Please have sutures removed in approximately 7 days.

## 2025-04-09 NOTE — ED PROVIDER NOTES
Emergency Department Trauma Note  Joy White 89 y.o. female MRN: [id number]  Unit/Bed#: ED TR13/TR13B Encounter: [id number]      Trauma Alert: Trauma Acuity: Trauma Evaluation  Model of Arrival: Mode of Arrival: BLS via    Trauma Team: Current Providers  Attending Provider: Azael Mena DO  Registered Nurse: Mikaela Cao, RN  Nurse Practitioner: JIGAR Hogan  Registered Nurse: Wilolw Freeman RN  Consultants:     None      History of Present Illness     Chief Complaint:   Chief Complaint   Patient presents with    Fall     Pt presents to ED via EMS from home; patient states that she was walking to the bathroom this morning with her walker and says she fell backwards and hit her head on the ground; no LOC, and denies any pain; no thinners; has a laceration on the back of her head     HPI:  Joy White is a 89 y.o. female who presents with fall.  Mechanism:Details of Incident: fall Injury Date: 04/09/25 Injury Time: 0200      Patient is an 89 yo F pmhx htn arriving for fall evaluation. Patient states a 2 AM lost her balance when walking to the bathroom and fell backwards, striking her head. Patient states she sat down for a bout 15 minutes and then got up and went back to bed. Caregiver sending patient to ED for further eval. Patient has no numbness, tingling. Denies chest pain, shortness of breath, abdominal pain. No outward signs of trauma on c/a/p. Patient has posterior head lac about 3 cm. Denies midline tenderness. No cervical, thoracic, or lumbar tenderness, no pain of torso. Patient denies numbness or tingling.       Review of Systems   Constitutional: Negative.    HENT: Negative.     Eyes: Negative.    Respiratory: Negative.     Cardiovascular: Negative.    Gastrointestinal: Negative.    Endocrine: Negative.    Genitourinary: Negative.  Negative for difficulty urinating.   Musculoskeletal: Negative.    Allergic/Immunologic: Negative.    Neurological: Negative.    Hematological: Negative.     Psychiatric/Behavioral: Negative.     All other systems reviewed and are negative.      Historical Information     Immunizations:   Immunization History   Administered Date(s) Administered    COVID-19 PFIZER VACCINE 0.3 ML IM 04/11/2021, 05/08/2021, 12/10/2021    INFLUENZA 10/29/2018, 09/25/2020, 10/01/2021, 10/07/2022, 12/01/2023    Pneumococcal Polysaccharide PPV23 12/19/2018    Tdap 04/09/2025    Tuberculin Skin Test-PPD Intradermal 09/23/2024, 10/02/2024    Unknown 12/10/2021, 12/01/2023    Zoster 09/02/2019, 11/29/2019    Zoster Vaccine Recombinant 09/02/2019, 09/27/2019, 11/29/2019       Past Medical History:   Diagnosis Date    Cardiac disease     CHF (congestive heart failure) (HCC)     Colitis     Hypertension     Psychiatric disorder     depression       Family History   Family history unknown: Yes     Past Surgical History:   Procedure Laterality Date    COLONOSCOPY  09/27/2010    VAGINAL DELIVERY      x2     Social History     Tobacco Use    Smoking status: Never    Smokeless tobacco: Never   Substance Use Topics    Alcohol use: Never    Drug use: Never     E-Cigarette/Vaping     E-Cigarette/Vaping Substances       Family History: non-contributory    Meds/Allergies   Prior to Admission Medications   Prescriptions Last Dose Informant Patient Reported? Taking?   ALPHAGAN P 0.1 %  Self Yes No   Sig: PUT 1 DROP IN BOTH EYES TWICE A DAY   MYRBETRIQ 25 MG TB24  Self Yes No   Sig: Take 25 mg by mouth daily   Sodium Phosphates (TGT Saline Laxative) ENEM   Yes No   Sig: Insert into the rectum   acetaminophen (TYLENOL) 325 mg tablet   Yes No   Sig: Take 650 mg by mouth every 4 (four) hours as needed for mild pain   amLODIPine (NORVASC) 5 mg tablet  Self Yes No   Sig: Take 5 mg by mouth daily   amitriptyline (ELAVIL) 50 mg tablet  Self Yes No   Sig: Take 50 mg by mouth daily at bedtime.   bisacodyl (DULCOLAX) 10 mg suppository   Yes No   Sig: Insert 10 mg into the rectum daily   docusate sodium (COLACE) 100 mg  capsule   No No   Sig: Take 1 capsule (100 mg total) by mouth 2 (two) times a day for 10 days   dorzolamide (TRUSOPT) 2 % ophthalmic solution  Self Yes No   Sig: Administer 1 drop to both eyes 2 (two) times a day    furosemide (LASIX) 20 mg tablet   Yes No   Sig: Take 10 mg by mouth daily as needed (as needed for leg swelling)   Patient not taking: Reported on 10/3/2024   latanoprost (XALATAN) 0.005 % ophthalmic solution   Yes No   Sig: Administer 1 drop to both eyes daily at bedtime   lidocaine (LIDODERM) 5 %   No No   Sig: Apply 1 patch topically over 12 hours daily for 10 days Remove & Discard patch within 12 hours or as directed by MD   lisinopril (ZESTRIL) 40 mg tablet  Self Yes No   Sig: Take 40 mg by mouth daily   magnesium hydroxide (MILK OF MAGNESIA) 400 mg/5 mL oral suspension   Yes No   Sig: Take by mouth daily as needed for constipation   meclizine (ANTIVERT) 12.5 MG tablet  Self No No   Sig: Take 1 tablet (12.5 mg total) by mouth 3 (three) times a day as needed for dizziness for up to 6 doses.   nebivolol (BYSTOLIC) 10 mg tablet  Self Yes No   Sig: Take 10 mg by mouth 2 (two) times a day.   ondansetron (ZOFRAN) 4 mg tablet   Yes No   Sig: Take 4 mg by mouth every 8 (eight) hours as needed for nausea or vomiting   pantoprazole (PROTONIX) 40 mg tablet   No No   Sig: Take 1 tablet (40 mg total) by mouth daily in the early morning for 14 days   polyethylene glycol (MIRALAX) 17 g packet   No No   Sig: Take 17 g by mouth daily for 10 days   travoprost (TRAVATAN Z) 0.004 % ophthalmic solution  Self Yes No   Si drop daily at bedtime.      Facility-Administered Medications: None       No Known Allergies    PHYSICAL EXAM    PE limited by:     Objective   Vitals:   First set: Temperature: 98.5 °F (36.9 °C) (25 1505)  Pulse: 87 (25 1502)  Respirations: 18 (25 1502)  Blood Pressure: (!) 173/83 (25 1502)  SpO2: 96 % (25 1502)    Primary Survey:   (A) Airway: patent  (B) Breathing:  lungs CTA b/l  (C) Circulation: Pulses:   normal  (D) Disabliity:  GCS Total:  15  (E) Expose:  Completed      Secondary Survey: (Click on Physical Exam tab above)  Physical Exam  Vitals and nursing note reviewed.   Constitutional:       Appearance: Normal appearance. She is normal weight.   HENT:      Head: Normocephalic.        Right Ear: External ear normal.      Left Ear: External ear normal.      Nose: Nose normal.      Mouth/Throat:      Mouth: Mucous membranes are moist.      Pharynx: Oropharynx is clear.   Eyes:      General:         Right eye: No discharge.         Left eye: No discharge.      Extraocular Movements: Extraocular movements intact.      Conjunctiva/sclera: Conjunctivae normal.      Pupils: Pupils are equal, round, and reactive to light.   Cardiovascular:      Rate and Rhythm: Normal rate and regular rhythm.      Pulses: Normal pulses.      Heart sounds: Normal heart sounds. No murmur heard.     No friction rub.   Pulmonary:      Effort: Pulmonary effort is normal. No respiratory distress.      Breath sounds: Normal breath sounds. No stridor. No wheezing, rhonchi or rales.   Chest:      Chest wall: No tenderness.   Abdominal:      General: Abdomen is flat. Bowel sounds are normal. There is no distension.      Palpations: Abdomen is soft. There is no mass.      Tenderness: There is no abdominal tenderness. There is no right CVA tenderness, left CVA tenderness, guarding or rebound.      Hernia: No hernia is present.   Musculoskeletal:         General: Normal range of motion.      Cervical back: Normal range of motion and neck supple.   Skin:     General: Skin is warm.      Capillary Refill: Capillary refill takes less than 2 seconds.   Neurological:      General: No focal deficit present.      Mental Status: She is alert and oriented to person, place, and time. Mental status is at baseline.      GCS: GCS eye subscore is 4. GCS verbal subscore is 5. GCS motor subscore is 6.      Cranial Nerves:  Cranial nerves 2-12 are intact.      Sensory: Sensation is intact.      Motor: Motor function is intact.      Coordination: Coordination is intact.      Comments: 5/5 upper extremity strength, no numbness or tingling   5/5 lower extremity strength, no numbness or tingling    Psychiatric:         Mood and Affect: Mood normal.         Behavior: Behavior normal.         Thought Content: Thought content normal.         Judgment: Judgment normal.         Cervical spine cleared by clinical criteria? No (imaging required)      Invasive Devices       None                   Lab Results:   Results Reviewed       Procedure Component Value Units Date/Time    Comprehensive metabolic panel [[id number]]  (Abnormal) Collected: 04/09/25 1516    Lab Status: Final result Specimen: Blood from Arm, Left Updated: 04/09/25 1537     Sodium 141 mmol/L      Potassium 3.6 mmol/L      Chloride 103 mmol/L      CO2 32 mmol/L      ANION GAP 6 mmol/L      BUN 14 mg/dL      Creatinine 0.47 mg/dL      Glucose 108 mg/dL      Calcium 9.1 mg/dL      AST 14 U/L      ALT 11 U/L      Alkaline Phosphatase 70 U/L      Total Protein 6.5 g/dL      Albumin 3.9 g/dL      Total Bilirubin 0.55 mg/dL      eGFR 88 ml/min/1.73sq m     Narrative:      National Kid[employer] Foundation guidelines for Chronic Kidney Disease (CKD):     Stage 1 with normal or high GFR (GFR > 90 mL/min/1.73 square meters)    Stage 2 Mild CKD (GFR = 60-89 mL/min/1.73 square meters)    Stage 3A Moderate CKD (GFR = 45-59 mL/min/1.73 square meters)    Stage 3B Moderate CKD (GFR = 30-44 mL/min/1.73 square meters)    Stage 4 Severe CKD (GFR = 15-29 mL/min/1.73 square meters)    Stage 5 End Stage CKD (GFR <15 mL/min/1.73 square meters)  Note: GFR calculation is accurate only with a steady state creatinine    CBC and differential [[id number]]  (Abnormal) Collected: 04/09/25 1516    Lab Status: Final result Specimen: Blood from Arm, Left Updated: 04/09/25 1523     WBC 7.27 Thousand/uL      RBC 4.53  Million/uL      Hemoglobin 13.5 g/dL      Hematocrit 43.4 %      MCV 96 fL      MCH 29.8 pg      MCHC 31.1 g/dL      RDW 12.6 %      MPV 9.4 fL      Platelets 214 Thousands/uL      nRBC 0 /100 WBCs      Segmented % 70 %      Immature Grans % 0 %      Lymphocytes % 21 %      Monocytes % 9 %      Eosinophils Relative 0 %      Basophils Relative 0 %      Absolute Neutrophils 5.03 Thousands/µL      Absolute Immature Grans 0.03 Thousand/uL      Absolute Lymphocytes 1.53 Thousands/µL      Absolute Monocytes 0.63 Thousand/µL      Eosinophils Absolute 0.02 Thousand/µL      Basophils Absolute 0.03 Thousands/µL                    Imaging Studies:   Direct to CT: Yes  TRAUMA - CT head wo contrast   Final Result by Moris Tenorio MD (04/09 1610)      No acute intracranial abnormality.                  Workstation performed: [id number]         TRAUMA - CT spine cervical wo contrast   Final Result by Moris Tenorio MD (04/09 1616)      No cervical spine fracture or traumatic malalignment.            The study was marked in EPIC for immediate notification.      Workstation performed: [id number]         XR Trauma chest portable   Final Result by Fara Ronquillo MD (04/09 1516)      No acute cardiopulmonary disease.            Workstation performed: [id number]               Procedures  Universal Protocol:  procedure performed by consultantRisks and benefits: risks, benefits and alternatives were discussed  Consent given by: patient  Patient understanding: patient states understanding of the procedure being performed  Patient consent: the patient's understanding of the procedure matches consent given  Procedure consent: procedure consent matches procedure scheduled  Required items: required blood products, implants, devices, and special equipment available  Patient identity confirmed: verbally with patient  Laceration repair    Date/Time: 4/9/2025 4:04 PM    Performed by: JIGAR Hogan  by: JIGAR Hogan  Body area: head/neck  Location details: scalp  Laceration length: 3 cm  Foreign bodies: no foreign bodies  Tendon involvement: none  Nerve involvement: none  Vascular damage: no    Wound Dehiscence:  Superficial Wound Dehiscence: simple closure      Procedure Details:  Irrigation solution: saline  Irrigation method: syringe  Amount of cleaning: standard  Debridement: none  Degree of undermining: none  Skin closure: staples  Number of sutures: 3  Approximation: close  Approximation difficulty: simple  Patient tolerance: patient tolerated the procedure well with no immediate complications               ED Course           Medical Decision Making  DDx: ICH, skull fracture, scalp laceration  Patient had fall with head strike last night a 0200. Denies LOC.  Is alert awake oriented x 4, GCS 15, answering questions appropriately.  Patient is acting appropriately.  Patient is otherwise having normal conversation with staff.  Patient does live alone.  Patient denies any chest pain, shortness of breath, abdominal pain, midline tenderness.  Ordered CT head, C-spine given mechanism.  Patient does have a laceration on the posterior head that does require repair.  See procedure note for specifics.  Blood work is reassuring with no leukocytosis, no anemia, no CHRISTY, no gross electrolyte abnormality.  Patient's daughter came to bedside.  Did discuss that patient wishes to return home, and as patient has a strong steady gait, and has normal mentation and has decision-making capacity at this time.  Offered short-term rehab evaluation, patient declines.Discussed wound care for staples.   Reviewed reasons to return to ed.  Patient verbalized understanding of diagnosis and agreement with discharge plan of care as well as understanding of reasons to return to ed.      Amount and/or Complexity of Data Reviewed  Labs: ordered.  Radiology: ordered.    Risk  Prescription drug  management.                Disposition  [facility] Transfer: No  Final diagnoses:   Injury of head, initial encounter   Laceration of head     Time reflects when diagnosis was documented in both MDM as applicable and the Disposition within this note       Time User Action Codes Description Comment    4/9/2025  4:22 PM Bela Boland Add [S09.90XA] Injury of head, initial encounter     4/9/2025  4:23 PM Bela Boland Add [S01.91XA] Laceration of head           ED Disposition       ED Disposition   Discharge    Condition   Stable    Date/Time   Wed Apr 9, 2025  4:22 PM    Comment   Joy White discharge to home/self care.                   Follow-up Information       Follow up With Specialties Details Why Contact Info Additional Information     [facility] Emergency Department Emergency Medicine   [address] [facility] [address] [phone] [facility] Emergency Department, [address] [place] [phone]    Radha Cabrera [address] [address]  [phone]             Discharge Medication List as of 4/9/2025  4:54 PM        CONTINUE these medications which have NOT CHANGED    Details   acetaminophen (TYLENOL) 325 mg tablet Take 650 mg by mouth every 4 (four) hours as needed for mild pain, Historical Med      ALPHAGAN P 0.1 % PUT 1 DROP IN BOTH EYES TWICE A DAY, Historical Med      amitriptyline (ELAVIL) 50 mg tablet Take 50 mg by mouth daily at bedtime., Historical Med      amLODIPine (NORVASC) 5 mg tablet Take 5 mg by mouth daily, Historical Med      bisacodyl (DULCOLAX) 10 mg suppository Insert 10 mg into the rectum daily, Historical Med      docusate sodium (COLACE) 100 mg capsule Take 1 capsule (100 mg total) by mouth 2 (two) times a day for 10 days, Starting Sun 9/22/2024, Until Wed 10/2/2024, No Print      dorzolamide (TRUSOPT) 2 % ophthalmic solution Administer 1  drop to both eyes 2 (two) times a day , Historical Med      furosemide (LASIX) 20 mg tablet Take 10 mg by mouth daily as needed (as needed for leg swelling), Historical Med      latanoprost (XALATAN) 0.005 % ophthalmic solution Administer 1 drop to both eyes daily at bedtime, Historical Med      lidocaine (LIDODERM) 5 % Apply 1 patch topically over 12 hours daily for 10 days Remove & Discard patch within 12 hours or as directed by MD, Starting Sun 9/22/2024, Until Wed 10/2/2024, No Print      lisinopril (ZESTRIL) 40 mg tablet Take 40 mg by mouth daily, Starting Wed 7/24/2019, Historical Med      magnesium hydroxide (MILK OF MAGNESIA) 400 mg/5 mL oral suspension Take by mouth daily as needed for constipation, Historical Med      meclizine (ANTIVERT) 12.5 MG tablet Take 1 tablet (12.5 mg total) by mouth 3 (three) times a day as needed for dizziness for up to 6 doses., Starting Sat 6/25/2016, Normal      MYRBETRIQ 25 MG TB24 Take 25 mg by mouth daily, Starting Wed 7/24/2019, Historical Med      nebivolol (BYSTOLIC) 10 mg tablet Take 10 mg by mouth 2 (two) times a day., Historical Med      ondansetron (ZOFRAN) 4 mg tablet Take 4 mg by mouth every 8 (eight) hours as needed for nausea or vomiting, Historical Med      pantoprazole (PROTONIX) 40 mg tablet Take 1 tablet (40 mg total) by mouth daily in the early morning for 14 days, Starting Mon 9/2/2019, Until Thu 10/3/2024, Normal      polyethylene glycol (MIRALAX) 17 g packet Take 17 g by mouth daily for 10 days, Starting Mon 9/23/2024, Until Thu 10/3/2024, No Print      Sodium Phosphates (TGT Saline Laxative) ENEM Insert into the rectum, Historical Med      travoprost (TRAVATAN Z) 0.004 % ophthalmic solution 1 drop daily at bedtime., Historical Med           No discharge procedures on file.    PDMP Review         Value Time User    PDMP Reviewed  Yes 9/1/2019  1:09 PM Adolfo Lennon MD            ED Provider  Electronically Signed by           Bela Boland  JIGAR  04/09/25 2245       Bela Boland, JIGAR  04/09/25 224

## 2025-04-10 LAB
ATRIAL RATE: 89 BPM
P AXIS: 52 DEGREES
PR INTERVAL: 148 MS
QRS AXIS: -7 DEGREES
QRSD INTERVAL: 84 MS
QT INTERVAL: 358 MS
QTC INTERVAL: 435 MS
T WAVE AXIS: 91 DEGREES
VENTRICULAR RATE: 89 BPM

## 2025-04-10 PROCEDURE — 93010 ELECTROCARDIOGRAM REPORT: CPT | Performed by: INTERNAL MEDICINE
